# Patient Record
Sex: MALE | Race: WHITE | NOT HISPANIC OR LATINO | Employment: FULL TIME | URBAN - METROPOLITAN AREA
[De-identification: names, ages, dates, MRNs, and addresses within clinical notes are randomized per-mention and may not be internally consistent; named-entity substitution may affect disease eponyms.]

---

## 2018-07-09 ENCOUNTER — HOSPITAL ENCOUNTER (OUTPATIENT)
Facility: HOSPITAL | Age: 53
Discharge: HOME | End: 2018-07-10
Attending: EMERGENCY MEDICINE | Admitting: EMERGENCY MEDICINE

## 2018-07-09 ENCOUNTER — APPOINTMENT (OUTPATIENT)
Dept: GENERAL RADIOLOGY | Facility: HOSPITAL | Age: 53
End: 2018-07-09

## 2018-07-09 DIAGNOSIS — I25.119 CORONARY ARTERY DISEASE INVOLVING NATIVE CORONARY ARTERY OF NATIVE HEART WITH ANGINA PECTORIS (HCC): ICD-10-CM

## 2018-07-09 DIAGNOSIS — R07.9 CHEST PAIN, UNSPECIFIED TYPE: ICD-10-CM

## 2018-07-09 DIAGNOSIS — E78.5 HYPERLIPIDEMIA LDL GOAL <70: ICD-10-CM

## 2018-07-09 DIAGNOSIS — I10 ESSENTIAL HYPERTENSION: ICD-10-CM

## 2018-07-09 DIAGNOSIS — I25.110 CORONARY ARTERY DISEASE INVOLVING NATIVE CORONARY ARTERY OF NATIVE HEART WITH UNSTABLE ANGINA PECTORIS (HCC): Primary | ICD-10-CM

## 2018-07-09 PROBLEM — I24.9 ACS (ACUTE CORONARY SYNDROME): Status: RESOLVED | Noted: 2018-07-09 | Resolved: 2018-07-09

## 2018-07-09 PROBLEM — E11.9 TYPE 2 DIABETES MELLITUS WITHOUT COMPLICATION, WITHOUT LONG-TERM CURRENT USE OF INSULIN (HCC): Status: ACTIVE | Noted: 2018-07-09

## 2018-07-09 PROBLEM — I24.9 ACUTE CORONARY SYNDROME: Status: ACTIVE | Noted: 2018-07-09

## 2018-07-09 PROBLEM — I24.9 ACS (ACUTE CORONARY SYNDROME): Status: ACTIVE | Noted: 2018-07-09

## 2018-07-09 LAB
ACT BLD: 384 SECONDS (ref 82–152)
ACT BLD: 466 SECONDS (ref 82–152)
ALBUMIN SERPL-MCNC: 4.62 G/DL (ref 3.2–4.8)
ALBUMIN/GLOB SERPL: 1.4 G/DL (ref 1.5–2.5)
ALP SERPL-CCNC: 67 U/L (ref 25–100)
ALT SERPL W P-5'-P-CCNC: 46 U/L (ref 7–40)
ANION GAP SERPL CALCULATED.3IONS-SCNC: 6 MMOL/L (ref 3–11)
AST SERPL-CCNC: 34 U/L (ref 0–33)
BASOPHILS # BLD AUTO: 0.03 10*3/MM3 (ref 0–0.2)
BASOPHILS NFR BLD AUTO: 0.3 % (ref 0–1)
BILIRUB SERPL-MCNC: 0.6 MG/DL (ref 0.3–1.2)
BNP SERPL-MCNC: 49 PG/ML (ref 0–100)
BUN BLD-MCNC: 18 MG/DL (ref 9–23)
BUN/CREAT SERPL: 15.8 (ref 7–25)
CALCIUM SPEC-SCNC: 9.8 MG/DL (ref 8.7–10.4)
CHLORIDE SERPL-SCNC: 102 MMOL/L (ref 99–109)
CO2 SERPL-SCNC: 30 MMOL/L (ref 20–31)
CREAT BLD-MCNC: 1.14 MG/DL (ref 0.6–1.3)
DEPRECATED RDW RBC AUTO: 37.5 FL (ref 37–54)
EOSINOPHIL # BLD AUTO: 0.26 10*3/MM3 (ref 0–0.3)
EOSINOPHIL NFR BLD AUTO: 2.8 % (ref 0–3)
ERYTHROCYTE [DISTWIDTH] IN BLOOD BY AUTOMATED COUNT: 12.8 % (ref 11.3–14.5)
GFR SERPL CREATININE-BSD FRML MDRD: 68 ML/MIN/1.73
GFR SERPL CREATININE-BSD FRML MDRD: 82 ML/MIN/1.73
GLOBULIN UR ELPH-MCNC: 3.4 GM/DL
GLUCOSE BLD-MCNC: 171 MG/DL (ref 70–100)
GLUCOSE BLDC GLUCOMTR-MCNC: 159 MG/DL (ref 70–130)
GLUCOSE BLDC GLUCOMTR-MCNC: 87 MG/DL (ref 70–130)
HCT VFR BLD AUTO: 40.4 % (ref 38.9–50.9)
HGB BLD-MCNC: 13.6 G/DL (ref 13.1–17.5)
HOLD SPECIMEN: NORMAL
HOLD SPECIMEN: NORMAL
IMM GRANULOCYTES # BLD: 0.1 10*3/MM3 (ref 0–0.03)
IMM GRANULOCYTES NFR BLD: 1.1 % (ref 0–0.6)
LIPASE SERPL-CCNC: 42 U/L (ref 6–51)
LYMPHOCYTES # BLD AUTO: 1.63 10*3/MM3 (ref 0.6–4.8)
LYMPHOCYTES NFR BLD AUTO: 17.5 % (ref 24–44)
MCH RBC QN AUTO: 27.3 PG (ref 27–31)
MCHC RBC AUTO-ENTMCNC: 33.7 G/DL (ref 32–36)
MCV RBC AUTO: 81.1 FL (ref 80–99)
MONOCYTES # BLD AUTO: 0.81 10*3/MM3 (ref 0–1)
MONOCYTES NFR BLD AUTO: 8.7 % (ref 0–12)
NEUTROPHILS # BLD AUTO: 6.6 10*3/MM3 (ref 1.5–8.3)
NEUTROPHILS NFR BLD AUTO: 70.7 % (ref 41–71)
PLATELET # BLD AUTO: 271 10*3/MM3 (ref 150–450)
PMV BLD AUTO: 11 FL (ref 6–12)
POTASSIUM BLD-SCNC: 3.6 MMOL/L (ref 3.5–5.5)
PROT SERPL-MCNC: 8 G/DL (ref 5.7–8.2)
RBC # BLD AUTO: 4.98 10*6/MM3 (ref 4.2–5.76)
SODIUM BLD-SCNC: 138 MMOL/L (ref 132–146)
TROPONIN I SERPL-MCNC: 9.63 NG/ML (ref 0–0.07)
WBC NRBC COR # BLD: 9.33 10*3/MM3 (ref 3.5–10.8)
WHOLE BLOOD HOLD SPECIMEN: NORMAL
WHOLE BLOOD HOLD SPECIMEN: NORMAL

## 2018-07-09 PROCEDURE — 93454 CORONARY ARTERY ANGIO S&I: CPT | Performed by: INTERNAL MEDICINE

## 2018-07-09 PROCEDURE — 92929 PR PRQ TRLUML CORONARY STENT W/ANGIO ADDL ART/BRNCH: CPT | Performed by: INTERNAL MEDICINE

## 2018-07-09 PROCEDURE — C1725 CATH, TRANSLUMIN NON-LASER: HCPCS | Performed by: INTERNAL MEDICINE

## 2018-07-09 PROCEDURE — 80053 COMPREHEN METABOLIC PANEL: CPT | Performed by: EMERGENCY MEDICINE

## 2018-07-09 PROCEDURE — 93005 ELECTROCARDIOGRAM TRACING: CPT

## 2018-07-09 PROCEDURE — 96375 TX/PRO/DX INJ NEW DRUG ADDON: CPT

## 2018-07-09 PROCEDURE — 25010000002 ONDANSETRON PER 1 MG: Performed by: NURSE PRACTITIONER

## 2018-07-09 PROCEDURE — 96366 THER/PROPH/DIAG IV INF ADDON: CPT

## 2018-07-09 PROCEDURE — C1874 STENT, COATED/COV W/DEL SYS: HCPCS | Performed by: INTERNAL MEDICINE

## 2018-07-09 PROCEDURE — 71045 X-RAY EXAM CHEST 1 VIEW: CPT

## 2018-07-09 PROCEDURE — 93005 ELECTROCARDIOGRAM TRACING: CPT | Performed by: EMERGENCY MEDICINE

## 2018-07-09 PROCEDURE — 93005 ELECTROCARDIOGRAM TRACING: CPT | Performed by: INTERNAL MEDICINE

## 2018-07-09 PROCEDURE — 25010000002 HEPARIN (PORCINE) PER 1000 UNITS: Performed by: INTERNAL MEDICINE

## 2018-07-09 PROCEDURE — 0 IOPAMIDOL PER 1 ML: Performed by: INTERNAL MEDICINE

## 2018-07-09 PROCEDURE — C1769 GUIDE WIRE: HCPCS | Performed by: INTERNAL MEDICINE

## 2018-07-09 PROCEDURE — 96365 THER/PROPH/DIAG IV INF INIT: CPT

## 2018-07-09 PROCEDURE — 92941 PRQ TRLML REVSC TOT OCCL AMI: CPT | Performed by: INTERNAL MEDICINE

## 2018-07-09 PROCEDURE — 83690 ASSAY OF LIPASE: CPT | Performed by: EMERGENCY MEDICINE

## 2018-07-09 PROCEDURE — 85347 COAGULATION TIME ACTIVATED: CPT

## 2018-07-09 PROCEDURE — C1887 CATHETER, GUIDING: HCPCS | Performed by: INTERNAL MEDICINE

## 2018-07-09 PROCEDURE — 83880 ASSAY OF NATRIURETIC PEPTIDE: CPT | Performed by: EMERGENCY MEDICINE

## 2018-07-09 PROCEDURE — 85025 COMPLETE CBC W/AUTO DIFF WBC: CPT | Performed by: EMERGENCY MEDICINE

## 2018-07-09 PROCEDURE — C9601 PERC DRUG-EL COR STENT BRAN: HCPCS | Performed by: INTERNAL MEDICINE

## 2018-07-09 PROCEDURE — 84484 ASSAY OF TROPONIN QUANT: CPT

## 2018-07-09 PROCEDURE — C9606 PERC D-E COR REVASC W AMI S: HCPCS | Performed by: INTERNAL MEDICINE

## 2018-07-09 PROCEDURE — 82962 GLUCOSE BLOOD TEST: CPT

## 2018-07-09 PROCEDURE — C1894 INTRO/SHEATH, NON-LASER: HCPCS | Performed by: INTERNAL MEDICINE

## 2018-07-09 PROCEDURE — 99285 EMERGENCY DEPT VISIT HI MDM: CPT

## 2018-07-09 PROCEDURE — 63710000001 INSULIN LISPRO (HUMAN) PER 5 UNITS: Performed by: INTERNAL MEDICINE

## 2018-07-09 PROCEDURE — 99219 PR INITIAL OBSERVATION CARE/DAY 50 MINUTES: CPT | Performed by: INTERNAL MEDICINE

## 2018-07-09 PROCEDURE — 25010000002 FENTANYL CITRATE (PF) 100 MCG/2ML SOLUTION: Performed by: INTERNAL MEDICINE

## 2018-07-09 PROCEDURE — 25010000002 MIDAZOLAM PER 1 MG: Performed by: INTERNAL MEDICINE

## 2018-07-09 PROCEDURE — 93010 ELECTROCARDIOGRAM REPORT: CPT | Performed by: INTERNAL MEDICINE

## 2018-07-09 DEVICE — XIENCE SIERRA™ EVEROLIMUS ELUTING CORONARY STENT SYSTEM 3.25 MM X 15 MM / RAPID-EXCHANGE
Type: IMPLANTABLE DEVICE | Status: FUNCTIONAL
Brand: XIENCE SIERRA™

## 2018-07-09 DEVICE — XIENCE SIERRA™ EVEROLIMUS ELUTING CORONARY STENT SYSTEM 2.50 MM X 23 MM / RAPID-EXCHANGE
Type: IMPLANTABLE DEVICE | Status: FUNCTIONAL
Brand: XIENCE SIERRA™

## 2018-07-09 DEVICE — XIENCE SIERRA™ EVEROLIMUS ELUTING CORONARY STENT SYSTEM 2.25 MM X 38 MM / RAPID-EXCHANGE
Type: IMPLANTABLE DEVICE | Status: FUNCTIONAL
Brand: XIENCE SIERRA™

## 2018-07-09 RX ORDER — POTASSIUM CHLORIDE 1.5 G/1.77G
40 POWDER, FOR SOLUTION ORAL AS NEEDED
Status: DISCONTINUED | OUTPATIENT
Start: 2018-07-09 | End: 2018-07-10 | Stop reason: HOSPADM

## 2018-07-09 RX ORDER — ASPIRIN 81 MG/1
81 TABLET ORAL DAILY
Status: DISCONTINUED | OUTPATIENT
Start: 2018-07-10 | End: 2018-07-10 | Stop reason: HOSPADM

## 2018-07-09 RX ORDER — ONDANSETRON 2 MG/ML
4 INJECTION INTRAMUSCULAR; INTRAVENOUS ONCE
Status: COMPLETED | OUTPATIENT
Start: 2018-07-09 | End: 2018-07-09

## 2018-07-09 RX ORDER — HYDROCHLOROTHIAZIDE 12.5 MG/1
12.5 TABLET ORAL DAILY
Status: DISCONTINUED | OUTPATIENT
Start: 2018-07-09 | End: 2018-07-10

## 2018-07-09 RX ORDER — ATORVASTATIN CALCIUM 40 MG/1
40 TABLET, FILM COATED ORAL DAILY
COMMUNITY
End: 2018-08-14

## 2018-07-09 RX ORDER — NITROGLYCERIN 20 MG/100ML
5-200 INJECTION INTRAVENOUS
Status: DISCONTINUED | OUTPATIENT
Start: 2018-07-09 | End: 2018-07-09

## 2018-07-09 RX ORDER — GLYBURIDE 2.5 MG/1
2.5 TABLET ORAL
COMMUNITY
End: 2018-08-14

## 2018-07-09 RX ORDER — FENOFIBRATE 160 MG/1
160 TABLET ORAL DAILY
COMMUNITY
End: 2018-07-10 | Stop reason: HOSPADM

## 2018-07-09 RX ORDER — HYDROCHLOROTHIAZIDE 12.5 MG/1
12.5 TABLET ORAL DAILY
COMMUNITY
End: 2018-07-10 | Stop reason: HOSPADM

## 2018-07-09 RX ORDER — LORAZEPAM 1 MG/1
1 TABLET ORAL EVERY 6 HOURS PRN
Status: DISCONTINUED | OUTPATIENT
Start: 2018-07-09 | End: 2018-07-10 | Stop reason: HOSPADM

## 2018-07-09 RX ORDER — SODIUM CHLORIDE 0.9 % (FLUSH) 0.9 %
10 SYRINGE (ML) INJECTION AS NEEDED
Status: DISCONTINUED | OUTPATIENT
Start: 2018-07-09 | End: 2018-07-10 | Stop reason: HOSPADM

## 2018-07-09 RX ORDER — ASPIRIN 81 MG/1
81 TABLET ORAL DAILY
COMMUNITY
End: 2018-08-14 | Stop reason: SDUPTHER

## 2018-07-09 RX ORDER — NICOTINE POLACRILEX 4 MG
15 LOZENGE BUCCAL
Status: DISCONTINUED | OUTPATIENT
Start: 2018-07-09 | End: 2018-07-10 | Stop reason: HOSPADM

## 2018-07-09 RX ORDER — HEPARIN SODIUM 1000 [USP'U]/ML
INJECTION, SOLUTION INTRAVENOUS; SUBCUTANEOUS AS NEEDED
Status: DISCONTINUED | OUTPATIENT
Start: 2018-07-09 | End: 2018-07-09 | Stop reason: HOSPADM

## 2018-07-09 RX ORDER — SODIUM CHLORIDE 9 MG/ML
3 INJECTION, SOLUTION INTRAVENOUS CONTINUOUS
Status: ACTIVE | OUTPATIENT
Start: 2018-07-09 | End: 2018-07-09

## 2018-07-09 RX ORDER — MIDAZOLAM HYDROCHLORIDE 1 MG/ML
INJECTION INTRAMUSCULAR; INTRAVENOUS AS NEEDED
Status: DISCONTINUED | OUTPATIENT
Start: 2018-07-09 | End: 2018-07-09 | Stop reason: HOSPADM

## 2018-07-09 RX ORDER — FENTANYL CITRATE 50 UG/ML
INJECTION, SOLUTION INTRAMUSCULAR; INTRAVENOUS AS NEEDED
Status: DISCONTINUED | OUTPATIENT
Start: 2018-07-09 | End: 2018-07-09 | Stop reason: HOSPADM

## 2018-07-09 RX ORDER — METFORMIN HYDROCHLORIDE 750 MG/1
750 TABLET, EXTENDED RELEASE ORAL 2 TIMES DAILY
COMMUNITY

## 2018-07-09 RX ORDER — MAGNESIUM SULFATE HEPTAHYDRATE 40 MG/ML
2 INJECTION, SOLUTION INTRAVENOUS AS NEEDED
Status: DISCONTINUED | OUTPATIENT
Start: 2018-07-09 | End: 2018-07-10 | Stop reason: HOSPADM

## 2018-07-09 RX ORDER — ASPIRIN 81 MG/1
324 TABLET, CHEWABLE ORAL ONCE
Status: COMPLETED | OUTPATIENT
Start: 2018-07-09 | End: 2018-07-09

## 2018-07-09 RX ORDER — DEXTROSE MONOHYDRATE 25 G/50ML
25 INJECTION, SOLUTION INTRAVENOUS
Status: DISCONTINUED | OUTPATIENT
Start: 2018-07-09 | End: 2018-07-10 | Stop reason: HOSPADM

## 2018-07-09 RX ORDER — ATORVASTATIN CALCIUM 40 MG/1
40 TABLET, FILM COATED ORAL DAILY
Status: DISCONTINUED | OUTPATIENT
Start: 2018-07-09 | End: 2018-07-10 | Stop reason: HOSPADM

## 2018-07-09 RX ORDER — POTASSIUM CHLORIDE 750 MG/1
40 CAPSULE, EXTENDED RELEASE ORAL AS NEEDED
Status: DISCONTINUED | OUTPATIENT
Start: 2018-07-09 | End: 2018-07-10 | Stop reason: HOSPADM

## 2018-07-09 RX ORDER — MAGNESIUM SULFATE HEPTAHYDRATE 40 MG/ML
4 INJECTION, SOLUTION INTRAVENOUS AS NEEDED
Status: DISCONTINUED | OUTPATIENT
Start: 2018-07-09 | End: 2018-07-10 | Stop reason: HOSPADM

## 2018-07-09 RX ORDER — SODIUM CHLORIDE 0.9 % (FLUSH) 0.9 %
1-10 SYRINGE (ML) INJECTION AS NEEDED
Status: DISCONTINUED | OUTPATIENT
Start: 2018-07-09 | End: 2018-07-10 | Stop reason: HOSPADM

## 2018-07-09 RX ADMIN — TICAGRELOR 90 MG: 90 TABLET ORAL at 20:39

## 2018-07-09 RX ADMIN — ONDANSETRON 4 MG: 2 INJECTION INTRAMUSCULAR; INTRAVENOUS at 14:00

## 2018-07-09 RX ADMIN — NITROGLYCERIN 10 MCG/MIN: 20 INJECTION INTRAVENOUS at 12:58

## 2018-07-09 RX ADMIN — INSULIN LISPRO 2 UNITS: 100 INJECTION, SOLUTION INTRAVENOUS; SUBCUTANEOUS at 20:39

## 2018-07-09 RX ADMIN — ASPIRIN 81 MG 324 MG: 81 TABLET ORAL at 12:24

## 2018-07-09 RX ADMIN — METOPROLOL TARTRATE 25 MG: 25 TABLET ORAL at 20:39

## 2018-07-09 RX ADMIN — ATORVASTATIN CALCIUM 40 MG: 40 TABLET, FILM COATED ORAL at 18:05

## 2018-07-09 NOTE — H&P
[facility] at [facility]  Cardiovascular Consultation Note    Reason for admission: Acute coronary syndrome          53-year-old gentleman with a strong family history of precocious coronary artery disease who 4 days ago on 7/5/18 developed substernal chest pain and was diagnosed with inferior STEMI. He was taken to [facility] and underwent reportedly complex PCI of his circumflex and right coronary arteries. In the catheterization note, there was mention of residual high-grade LAD disease which was not treated during the index procedure. The patient was discharged home as expected following his procedure. This morning, he developed recurrent chest pain symptoms identical to what he felt at the time of his STEMI. He presented to [facility] emergency room. No ST elevation was noted on EKG. Troponin measured 9. The patient presently chest pain-free on intravenous nitroglycerin.    Cardiac risk factors:  Family history of precocious coronary artery disease, known coronary artery disease, hypertension, hyperlipidemia, diabetes     Past medical and surgical history, social and family history reviewed in EMR.    REVIEW OF SYSTEMS:   H&P ROS reviewed and pertinent CV ROS as noted in HPI.         Vital Sign Min/Max for last 24 hours  Temp  Min: 98.3 °F (36.8 °C)  Max: 99 °F (37.2 °C)   BP  Min: 114/50  Max: 175/102   Pulse  Min: 81  Max: 94   Resp  Min: 16  Max: 18   SpO2  Min: 95 %  Max: 99 %   No Data Recorded    No intake or output data in the 24 hours ending 07/09/18 1500        Physical Exam   Constitutional: He is oriented to person, place, and time. He appears well-developed and well-nourished.   HENT:   Head: Normocephalic and atraumatic.   Eyes: Pupils are equal, round, and reactive to light. No scleral icterus.   Neck: No JVD present. Carotid bruit is not present. No thyromegaly present.   Cardiovascular: Normal rate and regular rhythm.  Exam reveals no gallop.     No murmur heard.  Pulmonary/Chest: Effort normal and breath sounds normal.   Abdominal: Soft. He exhibits no distension. There is no hepatosplenomegaly.   Musculoskeletal: He exhibits no edema.   Neurological: He is alert and oriented to person, place, and time.   Skin: Skin is warm and dry.   Psychiatric: He has a normal mood and affect. His behavior is normal.       EKG: NSR. T-wave inversion in the inferior lateral leads.    Lab Review:   Labs reviewed in the electronic medical record.  Pertinent findings include:  Lab Results   Component Value Date    GLUCOSE 171 (H) 07/09/2018    BUN 18 07/09/2018    CREATININE 1.14 07/09/2018    EGFRIFNONA 68 07/09/2018    EGFRIFAFRI 82 07/09/2018    BCR 15.8 07/09/2018    K 3.6 07/09/2018    CO2 30.0 07/09/2018    CALCIUM 9.8 07/09/2018    ALBUMIN 4.62 07/09/2018    LABIL2 1.4 (L) 07/09/2018    AST 34 (H) 07/09/2018    ALT 46 (H) 07/09/2018     Lab Results   Component Value Date    WBC 9.33 07/09/2018    HGB 13.6 07/09/2018    HCT 40.4 07/09/2018    MCV 81.1 07/09/2018     07/09/2018          Hospital Problem List     * (Principal)Acute coronary syndrome (CMS/HCC)    Overview Signed 7/9/2018  2:56 PM by Lio Abraham IV, MD     · [facility] ER presentation with recurrent chest pain and persistent biomarker elevation 4 days after inferior STEMI with PCI, 7/9/18         Coronary artery disease involving native coronary artery of native heart with angina pectoris (CMS/HCC)    Overview Addendum 7/9/2018  2:54 PM by Lio Abraham IV, MD     · Cardiac catheterization for inferior STEMI @ [facility] (07/05/2018): PCI to RCA, PCI to Circumflex.  Thrombectomy to circumflex.  POBA to RPL  · [facility] ER presentation with recurrent chest pain and persistent biomarker elevation, 7/09/2018         Essential hypertension    Hyperlipidemia LDL goal <70    Overview Signed 7/9/2018  2:31 PM by JONAH Rordiguez     · High intensity statin  therapy indicated given presence of CAD         Type 2 diabetes mellitus without complication, without long-term current use of insulin (CMS/[facility])         53-year-old gentleman who underwent primary PCI for STEMI on 7/5/18 who presents with recurrent chest pain symptoms. Troponin elevation is likely due to his primary event. However, he had a complex procedure per the 's report and also had residual LAD disease. I recommend we proceed with repeat cardiac catheterization. The patient and wife are in agreement.          · Cardiac cath via the left radial approach  · Further recommendations to follow      Lio Abraham IV, MD  7/9/2018

## 2018-07-09 NOTE — ED PROVIDER NOTES
Subjective   CP.    Just DC from [facility] yesterday for MI and x 3 stents.    Pain has gotten worse.    Does not want to go back to [place].    Told he had blockage they could not stent.        Chest Pain   Pain location:  Substernal area  Pain quality: aching    Pain radiates to:  L arm  Pain severity:  Moderate  Timing:  Constant  Progression:  Waxing and waning  Chronicity:  New  Context: at rest    Relieved by:  Rest  Worsened by:  Exertion  Ineffective treatments:  None tried  Associated symptoms: dizziness and nausea    Associated symptoms: no abdominal pain, no cough, no diaphoresis, no fever, no shortness of breath and no vomiting        Review of Systems   Constitutional: Negative for diaphoresis and fever.   HENT: Negative for congestion and sore throat.    Respiratory: Negative for cough, choking, chest tightness, shortness of breath and wheezing.    Cardiovascular: Positive for chest pain. Negative for leg swelling.   Gastrointestinal: Positive for nausea. Negative for abdominal distention, abdominal pain, diarrhea and vomiting.   Neurological: Positive for dizziness.   All other systems reviewed and are negative.      Past Medical History:   Diagnosis Date   • Diabetes mellitus (CMS/HCC)    • Hyperlipidemia    • Hypertension    • Myocardial infarction        Allergies   Allergen Reactions   • Codeine Anaphylaxis   • Lisinopril Cough       Past Surgical History:   Procedure Laterality Date   • ANKLE SURGERY Left    • CORONARY STENT PLACEMENT  07/05/2018   • SHOULDER ROTATOR CUFF REPAIR Right        History reviewed. No pertinent family history.    Social History     Social History   • Marital status:      Social History Main Topics   • Smoking status: Never Smoker   • Alcohol use No   • Drug use: No   • Sexual activity: Defer     Other Topics Concern   • Not on file           Objective   Physical Exam   Constitutional: He is oriented to person, place, and time. He appears  well-developed and well-nourished.   HENT:   Head: Normocephalic and atraumatic.   Right Ear: External ear normal.   Left Ear: External ear normal.   Nose: Nose normal.   Mouth/Throat: Oropharynx is clear and moist.   Eyes: Conjunctivae and EOM are normal. Pupils are equal, round, and reactive to light.   Neck: Normal range of motion. Neck supple.   Cardiovascular: Normal rate, regular rhythm, normal heart sounds and intact distal pulses.    Pulmonary/Chest: Effort normal and breath sounds normal.   Abdominal: Soft. Bowel sounds are normal.   Musculoskeletal: Normal range of motion.   Neurological: He is alert and oriented to person, place, and time.   Skin: Skin is warm and dry.   Psychiatric: He has a normal mood and affect. His behavior is normal. Judgment normal.       Critical Care  Performed by: SEBLE LONGORIA  Authorized by: MARIA VICTORIA BHANDARI     Critical care provider statement:     Critical care time (minutes):  35    Critical care was necessary to treat or prevent imminent or life-threatening deterioration of the following conditions:  Cardiac failure    Critical care was time spent personally by me on the following activities:  Re-evaluation of patient's condition, ordering and review of laboratory studies, examination of patient, evaluation of patient's response to treatment and discussions with consultants               ED Course  ED Course as of Jul 09 1525   Mon Jul 09, 2018   1338 Previous trp 30 at [place] recently.    I spoke with Tanvi with [employer] and they will see the pt.  [KARIME]      ED Course User Index  [JM] JONAH Milner                  [facility]      Final diagnoses:   Coronary artery disease involving native coronary artery of native heart with unstable angina pectoris (CMS/[facility])   Chest pain, unspecified type            JONAH Milner  07/09/18 1526

## 2018-07-10 ENCOUNTER — APPOINTMENT (OUTPATIENT)
Dept: CARDIOLOGY | Facility: HOSPITAL | Age: 53
End: 2018-07-10
Attending: INTERNAL MEDICINE

## 2018-07-10 VITALS
SYSTOLIC BLOOD PRESSURE: 130 MMHG | BODY MASS INDEX: 32.82 KG/M2 | TEMPERATURE: 98.2 F | OXYGEN SATURATION: 95 % | RESPIRATION RATE: 16 BRPM | HEIGHT: 74 IN | WEIGHT: 255.73 LBS | DIASTOLIC BLOOD PRESSURE: 81 MMHG | HEART RATE: 91 BPM

## 2018-07-10 PROBLEM — I25.5 ISCHEMIC CARDIOMYOPATHY: Status: ACTIVE | Noted: 2018-07-10

## 2018-07-10 LAB
ALBUMIN SERPL-MCNC: 4.05 G/DL (ref 3.2–4.8)
ALBUMIN/GLOB SERPL: 1.5 G/DL (ref 1.5–2.5)
ALP SERPL-CCNC: 59 U/L (ref 25–100)
ALT SERPL W P-5'-P-CCNC: 37 U/L (ref 7–40)
ANION GAP SERPL CALCULATED.3IONS-SCNC: 6 MMOL/L (ref 3–11)
ANION GAP SERPL CALCULATED.3IONS-SCNC: 6 MMOL/L (ref 3–11)
ARTICHOKE IGE QN: 89 MG/DL (ref 0–130)
ARTICHOKE IGE QN: 95 MG/DL (ref 0–130)
AST SERPL-CCNC: 30 U/L (ref 0–33)
BH CV ECHO MEAS - AO MAX PG (FULL): 1.3 MMHG
BH CV ECHO MEAS - AO MAX PG: 5 MMHG
BH CV ECHO MEAS - AO MEAN PG (FULL): 1.5 MMHG
BH CV ECHO MEAS - AO MEAN PG: 3.2 MMHG
BH CV ECHO MEAS - AO ROOT AREA (BSA CORRECTED): 1.5
BH CV ECHO MEAS - AO ROOT AREA: 10.3 CM^2
BH CV ECHO MEAS - AO ROOT DIAM: 3.6 CM
BH CV ECHO MEAS - AO V2 MAX: 107.7 CM/SEC
BH CV ECHO MEAS - AO V2 MEAN: 86.9 CM/SEC
BH CV ECHO MEAS - AO V2 VTI: 22.6 CM
BH CV ECHO MEAS - AVA(I,A): 3.6 CM^2
BH CV ECHO MEAS - AVA(I,D): 3.6 CM^2
BH CV ECHO MEAS - AVA(V,A): 4.4 CM^2
BH CV ECHO MEAS - AVA(V,D): 4.4 CM^2
BH CV ECHO MEAS - BSA(HAYCOCK): 2.5 M^2
BH CV ECHO MEAS - BSA: 2.4 M^2
BH CV ECHO MEAS - BZI_BMI: 32.1 KILOGRAMS/M^2
BH CV ECHO MEAS - BZI_METRIC_HEIGHT: 190 CM
BH CV ECHO MEAS - BZI_METRIC_WEIGHT: 116 KG
BH CV ECHO MEAS - CONTRAST EF (2CH): 37.9 ML/M^2
BH CV ECHO MEAS - CONTRAST EF 4CH: 46.2 ML/M^2
BH CV ECHO MEAS - EDV(CUBED): 130.3 ML
BH CV ECHO MEAS - EDV(MOD-SP2): 153 ML
BH CV ECHO MEAS - EDV(MOD-SP4): 199 ML
BH CV ECHO MEAS - EDV(TEICH): 122.1 ML
BH CV ECHO MEAS - EF(CUBED): 69.2 %
BH CV ECHO MEAS - EF(MOD-SP2): 37.9 %
BH CV ECHO MEAS - EF(MOD-SP4): 46.2 %
BH CV ECHO MEAS - EF(TEICH): 60.5 %
BH CV ECHO MEAS - ESV(CUBED): 40.2 ML
BH CV ECHO MEAS - ESV(MOD-SP2): 95 ML
BH CV ECHO MEAS - ESV(MOD-SP4): 107 ML
BH CV ECHO MEAS - ESV(TEICH): 48.3 ML
BH CV ECHO MEAS - FS: 32.4 %
BH CV ECHO MEAS - IVS/LVPW: 0.85
BH CV ECHO MEAS - IVSD: 1.1 CM
BH CV ECHO MEAS - LA DIMENSION: 3.7 CM
BH CV ECHO MEAS - LA/AO: 1
BH CV ECHO MEAS - LAT PEAK E' VEL: 4.5 CM/SEC
BH CV ECHO MEAS - LV DIASTOLIC VOL/BSA (35-75): 81.8 ML/M^2
BH CV ECHO MEAS - LV MASS(C)D: 238.1 GRAMS
BH CV ECHO MEAS - LV MASS(C)DI: 97.9 GRAMS/M^2
BH CV ECHO MEAS - LV MAX PG: 3.7 MMHG
BH CV ECHO MEAS - LV MEAN PG: 1.7 MMHG
BH CV ECHO MEAS - LV SYSTOLIC VOL/BSA (12-30): 44 ML/M^2
BH CV ECHO MEAS - LV V1 MAX: 95.6 CM/SEC
BH CV ECHO MEAS - LV V1 MEAN: 60.6 CM/SEC
BH CV ECHO MEAS - LV V1 VTI: 16.4 CM
BH CV ECHO MEAS - LVIDD: 5.1 CM
BH CV ECHO MEAS - LVIDS: 3.4 CM
BH CV ECHO MEAS - LVLD AP2: 9.2 CM
BH CV ECHO MEAS - LVLD AP4: 9.5 CM
BH CV ECHO MEAS - LVLS AP2: 8.7 CM
BH CV ECHO MEAS - LVLS AP4: 8.7 CM
BH CV ECHO MEAS - LVOT AREA (M): 4.9 CM^2
BH CV ECHO MEAS - LVOT AREA: 5 CM^2
BH CV ECHO MEAS - LVOT DIAM: 2.5 CM
BH CV ECHO MEAS - LVPWD: 1.3 CM
BH CV ECHO MEAS - MED PEAK E' VEL: 6.03 CM/SEC
BH CV ECHO MEAS - MV A MAX VEL: 92.8 CM/SEC
BH CV ECHO MEAS - MV DEC SLOPE: 254.1 CM/SEC^2
BH CV ECHO MEAS - MV E MAX VEL: 61.2 CM/SEC
BH CV ECHO MEAS - MV E/A: 0.66
BH CV ECHO MEAS - MV P1/2T MAX VEL: 60.8 CM/SEC
BH CV ECHO MEAS - MV P1/2T: 70.1 MSEC
BH CV ECHO MEAS - MVA P1/2T LCG: 3.6 CM^2
BH CV ECHO MEAS - MVA(P1/2T): 3.1 CM^2
BH CV ECHO MEAS - PA ACC SLOPE: 388.2 CM/SEC^2
BH CV ECHO MEAS - PA ACC TIME: 0.14 SEC
BH CV ECHO MEAS - PA MAX PG (FULL): 3.7 MMHG
BH CV ECHO MEAS - PA MAX PG: 4.7 MMHG
BH CV ECHO MEAS - PA PR(ACCEL): 17.2 MMHG
BH CV ECHO MEAS - PA V2 MAX: 108.6 CM/SEC
BH CV ECHO MEAS - RV MAX PG: 1.1 MMHG
BH CV ECHO MEAS - RV V1 MAX: 51.3 CM/SEC
BH CV ECHO MEAS - SI(AO): 96.1 ML/M^2
BH CV ECHO MEAS - SI(CUBED): 37.1 ML/M^2
BH CV ECHO MEAS - SI(LVOT): 33.5 ML/M^2
BH CV ECHO MEAS - SI(MOD-SP2): 23.9 ML/M^2
BH CV ECHO MEAS - SI(MOD-SP4): 37.8 ML/M^2
BH CV ECHO MEAS - SI(TEICH): 30.4 ML/M^2
BH CV ECHO MEAS - SV(AO): 233.6 ML
BH CV ECHO MEAS - SV(CUBED): 90.1 ML
BH CV ECHO MEAS - SV(LVOT): 81.5 ML
BH CV ECHO MEAS - SV(MOD-SP2): 58 ML
BH CV ECHO MEAS - SV(MOD-SP4): 92 ML
BH CV ECHO MEAS - SV(TEICH): 73.8 ML
BH CV ECHO MEASUREMENTS AVERAGE E/E' RATIO: 11.62
BH CV XLRA - RV BASE: 4.1 CM
BH CV XLRA - RV LENGTH: 8.4 CM
BH CV XLRA - RV MID: 3.1 CM
BILIRUB SERPL-MCNC: 0.5 MG/DL (ref 0.3–1.2)
BUN BLD-MCNC: 15 MG/DL (ref 9–23)
BUN BLD-MCNC: 15 MG/DL (ref 9–23)
BUN/CREAT SERPL: 14.3 (ref 7–25)
BUN/CREAT SERPL: 14.3 (ref 7–25)
CALCIUM SPEC-SCNC: 8.8 MG/DL (ref 8.7–10.4)
CALCIUM SPEC-SCNC: 8.8 MG/DL (ref 8.7–10.4)
CHLORIDE SERPL-SCNC: 104 MMOL/L (ref 99–109)
CHLORIDE SERPL-SCNC: 104 MMOL/L (ref 99–109)
CHOLEST SERPL-MCNC: 129 MG/DL (ref 0–200)
CHOLEST SERPL-MCNC: 136 MG/DL (ref 0–200)
CO2 SERPL-SCNC: 28 MMOL/L (ref 20–31)
CO2 SERPL-SCNC: 28 MMOL/L (ref 20–31)
CREAT BLD-MCNC: 1.05 MG/DL (ref 0.6–1.3)
CREAT BLD-MCNC: 1.05 MG/DL (ref 0.6–1.3)
DEPRECATED RDW RBC AUTO: 39 FL (ref 37–54)
ERYTHROCYTE [DISTWIDTH] IN BLOOD BY AUTOMATED COUNT: 13 % (ref 11.3–14.5)
GFR SERPL CREATININE-BSD FRML MDRD: 74 ML/MIN/1.73
GFR SERPL CREATININE-BSD FRML MDRD: 74 ML/MIN/1.73
GLOBULIN UR ELPH-MCNC: 2.8 GM/DL
GLUCOSE BLD-MCNC: 156 MG/DL (ref 70–100)
GLUCOSE BLD-MCNC: 156 MG/DL (ref 70–100)
HBA1C MFR BLD: 9 % (ref 4.8–5.6)
HCT VFR BLD AUTO: 36.1 % (ref 38.9–50.9)
HDLC SERPL-MCNC: 28 MG/DL (ref 40–60)
HDLC SERPL-MCNC: 30 MG/DL (ref 40–60)
HGB BLD-MCNC: 12 G/DL (ref 13.1–17.5)
LEFT ATRIUM VOLUME INDEX: 27.2 ML/M2
LEFT ATRIUM VOLUME: 66 CM3
LV EF 2D ECHO EST: 42 %
MAGNESIUM SERPL-MCNC: 1.8 MG/DL (ref 1.3–2.7)
MCH RBC QN AUTO: 27.1 PG (ref 27–31)
MCHC RBC AUTO-ENTMCNC: 33.2 G/DL (ref 32–36)
MCV RBC AUTO: 81.5 FL (ref 80–99)
PLATELET # BLD AUTO: 228 10*3/MM3 (ref 150–450)
PMV BLD AUTO: 11.4 FL (ref 6–12)
POTASSIUM BLD-SCNC: 3.8 MMOL/L (ref 3.5–5.5)
POTASSIUM BLD-SCNC: 3.8 MMOL/L (ref 3.5–5.5)
PROT SERPL-MCNC: 6.8 G/DL (ref 5.7–8.2)
RBC # BLD AUTO: 4.43 10*6/MM3 (ref 4.2–5.76)
SODIUM BLD-SCNC: 138 MMOL/L (ref 132–146)
SODIUM BLD-SCNC: 138 MMOL/L (ref 132–146)
TRIGL SERPL-MCNC: 78 MG/DL (ref 0–150)
TRIGL SERPL-MCNC: 78 MG/DL (ref 0–150)
WBC NRBC COR # BLD: 8.28 10*3/MM3 (ref 3.5–10.8)

## 2018-07-10 PROCEDURE — 83735 ASSAY OF MAGNESIUM: CPT | Performed by: INTERNAL MEDICINE

## 2018-07-10 PROCEDURE — 25010000002 SULFUR HEXAFLUORIDE MICROSPH 60.7-25 MG RECONSTITUTED SUSPENSION: Performed by: INTERNAL MEDICINE

## 2018-07-10 PROCEDURE — 83036 HEMOGLOBIN GLYCOSYLATED A1C: CPT | Performed by: INTERNAL MEDICINE

## 2018-07-10 PROCEDURE — 93306 TTE W/DOPPLER COMPLETE: CPT | Performed by: INTERNAL MEDICINE

## 2018-07-10 PROCEDURE — 25010000002 ENOXAPARIN PER 10 MG: Performed by: INTERNAL MEDICINE

## 2018-07-10 PROCEDURE — 80061 LIPID PANEL: CPT | Performed by: INTERNAL MEDICINE

## 2018-07-10 PROCEDURE — 99217 PR OBSERVATION CARE DISCHARGE MANAGEMENT: CPT | Performed by: INTERNAL MEDICINE

## 2018-07-10 PROCEDURE — 85027 COMPLETE CBC AUTOMATED: CPT | Performed by: INTERNAL MEDICINE

## 2018-07-10 PROCEDURE — 93306 TTE W/DOPPLER COMPLETE: CPT

## 2018-07-10 PROCEDURE — 80053 COMPREHEN METABOLIC PANEL: CPT | Performed by: INTERNAL MEDICINE

## 2018-07-10 RX ORDER — NITROGLYCERIN 0.4 MG/1
TABLET SUBLINGUAL
Qty: 25 TABLET | Refills: 11 | Status: SHIPPED | OUTPATIENT
Start: 2018-07-10 | End: 2018-07-11

## 2018-07-10 RX ORDER — VALSARTAN 80 MG/1
80 TABLET ORAL DAILY
Qty: 30 TABLET | Refills: 11 | Status: SHIPPED | OUTPATIENT
Start: 2018-07-10 | End: 2018-07-11

## 2018-07-10 RX ADMIN — METOPROLOL TARTRATE 25 MG: 25 TABLET ORAL at 08:04

## 2018-07-10 RX ADMIN — ENOXAPARIN SODIUM 40 MG: 40 INJECTION SUBCUTANEOUS at 05:24

## 2018-07-10 RX ADMIN — ASPIRIN 81 MG: 81 TABLET, COATED ORAL at 08:04

## 2018-07-10 RX ADMIN — ATORVASTATIN CALCIUM 40 MG: 40 TABLET, FILM COATED ORAL at 08:04

## 2018-07-10 RX ADMIN — SULFUR HEXAFLUORIDE 2 ML: KIT at 10:15

## 2018-07-10 RX ADMIN — TICAGRELOR 90 MG: 90 TABLET ORAL at 08:04

## 2018-07-10 NOTE — PLAN OF CARE
Problem: Patient Care Overview  Goal: Plan of Care Review  Outcome: Ongoing (interventions implemented as appropriate)   07/10/18 0700   Coping/Psychosocial   Plan of Care Reviewed With patient   Plan of Care Review   Progress improving   OTHER   Outcome Summary PT AWAITNG DISCHARGE. NO PROBLEMS NOTED AT THIS TIME.WILL CONTINUE MONITORING.       Problem: Cardiac Catheterization (Diagnostic/Interventional) (Adult)  Goal: Signs and Symptoms of Listed Potential Problems Will be Absent, Minimized or Managed (Cardiac Catheterization)  Outcome: Ongoing (interventions implemented as appropriate)   07/10/18 0700   Goal/Outcome Evaluation   Problems Assessed (Cardiac Catheterization) all   Problems Present (Cardiac Cath) none

## 2018-07-10 NOTE — PLAN OF CARE
Problem: Patient Care Overview  Goal: Plan of Care Review  Outcome: Ongoing (interventions implemented as appropriate)    Goal: Individualization and Mutuality  Outcome: Ongoing (interventions implemented as appropriate)    Goal: Discharge Needs Assessment  Outcome: Ongoing (interventions implemented as appropriate)    Goal: Interprofessional Rounds/Family Conf  Outcome: Ongoing (interventions implemented as appropriate)      Problem: Cardiac Catheterization (Diagnostic/Interventional) (Adult)  Goal: Signs and Symptoms of Listed Potential Problems Will be Absent, Minimized or Managed (Cardiac Catheterization)  Outcome: Ongoing (interventions implemented as appropriate)    Goal: Anesthesia/Sedation Recovery  Outcome: Outcome(s) achieved Date Met: 07/09/18

## 2018-07-11 ENCOUNTER — DOCUMENTATION (OUTPATIENT)
Dept: CARDIAC REHAB | Facility: HOSPITAL | Age: 53
End: 2018-07-11

## 2018-07-11 RX ORDER — VALSARTAN 80 MG/1
80 TABLET ORAL DAILY
Qty: 30 TABLET | Refills: 10 | Status: SHIPPED | OUTPATIENT
Start: 2018-07-11 | End: 2018-08-14

## 2018-07-11 RX ORDER — NITROGLYCERIN 0.4 MG/1
TABLET SUBLINGUAL
Qty: 25 TABLET | Refills: 10 | Status: SHIPPED | OUTPATIENT
Start: 2018-07-11 | End: 2018-10-11 | Stop reason: SDUPTHER

## 2018-07-18 ENCOUNTER — TELEPHONE (OUTPATIENT)
Dept: CARDIOLOGY | Facility: CLINIC | Age: 53
End: 2018-07-18

## 2018-07-18 NOTE — TELEPHONE ENCOUNTER
I spoke with the patient. [facility] needs his written permission for us to appeal his Brilinta. He verbalized understanding of completing the form and mailing it back.

## 2018-07-24 ENCOUNTER — TELEPHONE (OUTPATIENT)
Dept: CARDIOLOGY | Facility: CLINIC | Age: 53
End: 2018-07-24

## 2018-07-24 ENCOUNTER — DOCUMENTATION (OUTPATIENT)
Dept: CARDIAC REHAB | Facility: HOSPITAL | Age: 53
End: 2018-07-24

## 2018-07-24 DIAGNOSIS — R06.02 SHORTNESS OF BREATH: Primary | ICD-10-CM

## 2018-07-24 RX ORDER — CLOPIDOGREL BISULFATE 75 MG/1
75 TABLET ORAL DAILY
Qty: 93 TABLET | Refills: 0 | Status: SHIPPED | OUTPATIENT
Start: 2018-07-24 | End: 2018-08-14 | Stop reason: SDUPTHER

## 2018-07-24 NOTE — PROGRESS NOTES
Pt. Referred for Phase II Cardiac Rehab. Staff discussed benefits of exercise, program protocol, and educational material provided. Teach back verified.  Permission granted from patient for staff to fax referral information to outlying program at this time.  Staff to fax referral info to [facility].

## 2018-07-24 NOTE — TELEPHONE ENCOUNTER
Patient called to report that he has been having dizziness when he bends over, shortness of breath at rest and exertion, fatigue, achy, flu like symptoms. This has occurred the last 3 days. He climbed up a 30 ft ladder at work this AM, stumbled and could not catch his breath. Denies chest pain or leg swelling. /70, HR 75. He has not checked his temp but has been breaking out in cold sweats. He went to his PCP this AM but with his history, he would not order anything and deferred him to you or the ER. He says these are NOT like his previous STEMI symptoms.     I did confirm with him he is taking his metoprolol 25 mg BID, Brilinta and ASA. HE has not missed any doses.

## 2018-07-24 NOTE — TELEPHONE ENCOUNTER
Needs CMP, CBC, BNP, CPK, lipids, CXR and flu swab.  Change Brillinta to clopidogrel with a 300 mg loading dose.

## 2018-07-25 ENCOUNTER — HOSPITAL ENCOUNTER (OUTPATIENT)
Dept: GENERAL RADIOLOGY | Facility: HOSPITAL | Age: 53
Discharge: HOME | End: 2018-07-25
Attending: INTERNAL MEDICINE | Admitting: INTERNAL MEDICINE

## 2018-07-25 ENCOUNTER — RESULTS ENCOUNTER (OUTPATIENT)
Dept: CARDIOLOGY | Facility: CLINIC | Age: 53
End: 2018-07-25

## 2018-07-25 ENCOUNTER — LAB (OUTPATIENT)
Dept: LAB | Facility: HOSPITAL | Age: 53
End: 2018-07-25

## 2018-07-25 DIAGNOSIS — R06.02 SHORTNESS OF BREATH: ICD-10-CM

## 2018-07-25 LAB
ALBUMIN SERPL-MCNC: 4.41 G/DL (ref 3.2–4.8)
ALBUMIN/GLOB SERPL: 1.4 G/DL (ref 1.5–2.5)
ALP SERPL-CCNC: 98 U/L (ref 25–100)
ALT SERPL W P-5'-P-CCNC: 52 U/L (ref 7–40)
ANION GAP SERPL CALCULATED.3IONS-SCNC: 3 MMOL/L (ref 3–11)
ARTICHOKE IGE QN: 82 MG/DL (ref 0–130)
AST SERPL-CCNC: 29 U/L (ref 0–33)
BILIRUB SERPL-MCNC: 0.6 MG/DL (ref 0.3–1.2)
BNP SERPL-MCNC: 33 PG/ML (ref 0–100)
BUN BLD-MCNC: 15 MG/DL (ref 9–23)
BUN/CREAT SERPL: 11.3 (ref 7–25)
CALCIUM SPEC-SCNC: 9.4 MG/DL (ref 8.7–10.4)
CHLORIDE SERPL-SCNC: 107 MMOL/L (ref 99–109)
CHOLEST SERPL-MCNC: 120 MG/DL (ref 0–200)
CK SERPL-CCNC: 55 U/L (ref 26–174)
CO2 SERPL-SCNC: 33 MMOL/L (ref 20–31)
CREAT BLD-MCNC: 1.33 MG/DL (ref 0.6–1.3)
DEPRECATED RDW RBC AUTO: 40.1 FL (ref 37–54)
ERYTHROCYTE [DISTWIDTH] IN BLOOD BY AUTOMATED COUNT: 13.2 % (ref 11.3–14.5)
FLUAV SUBTYP SPEC NAA+PROBE: NOT DETECTED
FLUBV RNA ISLT QL NAA+PROBE: NOT DETECTED
GFR SERPL CREATININE-BSD FRML MDRD: 57 ML/MIN/1.73
GLOBULIN UR ELPH-MCNC: 3.1 GM/DL
GLUCOSE BLD-MCNC: 134 MG/DL (ref 70–100)
HCT VFR BLD AUTO: 43.2 % (ref 38.9–50.9)
HDLC SERPL-MCNC: 34 MG/DL (ref 40–60)
HGB BLD-MCNC: 13.9 G/DL (ref 13.1–17.5)
MCH RBC QN AUTO: 27.1 PG (ref 27–31)
MCHC RBC AUTO-ENTMCNC: 32.2 G/DL (ref 32–36)
MCV RBC AUTO: 84.2 FL (ref 80–99)
PLATELET # BLD AUTO: 284 10*3/MM3 (ref 150–450)
PMV BLD AUTO: 11.2 FL (ref 6–12)
POTASSIUM BLD-SCNC: 4.7 MMOL/L (ref 3.5–5.5)
PROT SERPL-MCNC: 7.5 G/DL (ref 5.7–8.2)
RBC # BLD AUTO: 5.13 10*6/MM3 (ref 4.2–5.76)
SODIUM BLD-SCNC: 143 MMOL/L (ref 132–146)
TRIGL SERPL-MCNC: 98 MG/DL (ref 0–150)
WBC NRBC COR # BLD: 6.18 10*3/MM3 (ref 3.5–10.8)

## 2018-07-25 PROCEDURE — 80053 COMPREHEN METABOLIC PANEL: CPT

## 2018-07-25 PROCEDURE — 87502 INFLUENZA DNA AMP PROBE: CPT

## 2018-07-25 PROCEDURE — 80061 LIPID PANEL: CPT

## 2018-07-25 PROCEDURE — 71046 X-RAY EXAM CHEST 2 VIEWS: CPT

## 2018-07-25 PROCEDURE — 82550 ASSAY OF CK (CPK): CPT

## 2018-07-25 PROCEDURE — 36415 COLL VENOUS BLD VENIPUNCTURE: CPT | Performed by: INTERNAL MEDICINE

## 2018-07-25 PROCEDURE — 85027 COMPLETE CBC AUTOMATED: CPT

## 2018-07-25 PROCEDURE — 83880 ASSAY OF NATRIURETIC PEPTIDE: CPT | Performed by: INTERNAL MEDICINE

## 2018-08-08 ENCOUNTER — TELEPHONE (OUTPATIENT)
Dept: CARDIOLOGY | Facility: CLINIC | Age: 53
End: 2018-08-08

## 2018-08-08 NOTE — TELEPHONE ENCOUNTER
Wendy from [place] [facility] called to let us know they made multiple attempts to schedule. The patient was supposed to notify rehab when he could rearrange his work schedule but never contacted them. They closed the referral d/t multiple attempts.

## 2018-08-14 ENCOUNTER — OFFICE VISIT (OUTPATIENT)
Dept: CARDIOLOGY | Facility: CLINIC | Age: 53
End: 2018-08-14

## 2018-08-14 VITALS
HEART RATE: 71 BPM | WEIGHT: 250 LBS | BODY MASS INDEX: 31.08 KG/M2 | HEIGHT: 75 IN | SYSTOLIC BLOOD PRESSURE: 120 MMHG | DIASTOLIC BLOOD PRESSURE: 84 MMHG

## 2018-08-14 DIAGNOSIS — I25.5 ISCHEMIC CARDIOMYOPATHY: ICD-10-CM

## 2018-08-14 DIAGNOSIS — E78.5 HYPERLIPIDEMIA LDL GOAL <70: ICD-10-CM

## 2018-08-14 DIAGNOSIS — E11.9 TYPE 2 DIABETES MELLITUS WITHOUT COMPLICATION, WITHOUT LONG-TERM CURRENT USE OF INSULIN (HCC): ICD-10-CM

## 2018-08-14 DIAGNOSIS — I10 ESSENTIAL HYPERTENSION: ICD-10-CM

## 2018-08-14 DIAGNOSIS — I25.119 CORONARY ARTERY DISEASE INVOLVING NATIVE CORONARY ARTERY OF NATIVE HEART WITH ANGINA PECTORIS (HCC): Primary | ICD-10-CM

## 2018-08-14 PROBLEM — I24.9 ACUTE CORONARY SYNDROME: Status: RESOLVED | Noted: 2018-07-09 | Resolved: 2018-08-14

## 2018-08-14 PROCEDURE — 99214 OFFICE O/P EST MOD 30 MIN: CPT | Performed by: NURSE PRACTITIONER

## 2018-08-14 RX ORDER — LOSARTAN POTASSIUM 25 MG/1
12.5 TABLET ORAL DAILY
Qty: 90 TABLET | Refills: 3 | Status: SHIPPED | OUTPATIENT
Start: 2018-08-14 | End: 2018-12-18 | Stop reason: SDUPTHER

## 2018-08-14 RX ORDER — ASPIRIN 81 MG/1
81 TABLET ORAL DAILY
Qty: 90 TABLET | Refills: 3 | Status: SHIPPED | OUTPATIENT
Start: 2018-08-14 | End: 2018-12-18 | Stop reason: SDUPTHER

## 2018-08-14 RX ORDER — ROSUVASTATIN CALCIUM 20 MG/1
20 TABLET, COATED ORAL DAILY
Qty: 90 TABLET | Refills: 1 | Status: SHIPPED | OUTPATIENT
Start: 2018-08-14 | End: 2018-08-22 | Stop reason: SDUPTHER

## 2018-08-14 RX ORDER — CLOPIDOGREL BISULFATE 75 MG/1
75 TABLET ORAL DAILY
Qty: 90 TABLET | Refills: 3 | Status: SHIPPED | OUTPATIENT
Start: 2018-08-14 | End: 2018-12-10 | Stop reason: SDUPTHER

## 2018-08-14 NOTE — ASSESSMENT & PLAN NOTE
· Keep hemoglobin A1c less than 7  · Consistent low carbohydrate healthy heart diet  · 30 minutes physical activity most days of the week

## 2018-08-14 NOTE — ASSESSMENT & PLAN NOTE
· Continue dual antiplatelet therapy with aspirin and Plavix until 07/30/2019  · Continue statin and beta blocker therapy

## 2018-08-14 NOTE — ASSESSMENT & PLAN NOTE
· Stable NYHA class II symptoms  · Continue metoprolol tartrate 25 mg twice a day  · Discontinue valsartan and  · Start losartan 12.5 mg daily.  Can increase if blood pressure will allow

## 2018-08-14 NOTE — ASSESSMENT & PLAN NOTE
· Hypertension is controlled  · Continue valsartan 80 mg daily  · Continue metoprolol tartrate 25 mg twice a day

## 2018-08-14 NOTE — PROGRESS NOTES
Encounter Date:08/14/2018    Patient ID: Maurice Harvey is a 53 y.o. male who is  and resides in [address] [place] [address].  He is a heavy     CC/Reason for visit:  Coronary Artery Disease            Maurice Harvey returns today for follow-up after recent hospitalization at [facility] for unstable angina.  The patient had no prior cardiac issues but did have a known history of diabetes mellitus not well controlled.  On 7/5/2018 he developed substernal chest pain and was diagnosed with inferior STEMI.  He was transported via ambulance to [facility] and underwent a complex primary PCI of his circumflex and right coronary arteries.  In the catheterization note there was mention of residual high grade LAD disease that was not treated during the initial procedure.  The patient was discharged home and on 7/9/2018 the patient developed recurrent chest pain symptoms similar to how he felt at the time of his STEMI.  He presented to [facility] emergency room where troponin returned positive but no ST elevation was noted on the EKG.  He was taken to the cardiac catheterization lab by Dr. Geovanni Abraham and underwent PCI to his LAD.  Since discharge from the hospital he has now returned back to work.  He is unable to attend cardiac rehabilitation because of a conflicting work schedule.  He denies any further episodes of chest pain.  He has noted lower extremity leg weakness since starting on Lipitor.  The other day he was walking up a steep hill and did get winded but nothing that caused him to have to stop.  He has not taken his valsartan and for the past week because he ran out of it.  He is concerned about the recall of this medication due to it causing possible cancer.  His primary care provider is working to get his diabetes under control.  He was taken off glyburide and placed on Jardiance but has not had a repeat hemoglobin A1c level yet.  He is  "still taking dual antiplatelet therapy with aspirin and Plavix and tolerating without any reports of active or overt bleeding.    Review of Systems   Constitution: Positive for chills and malaise/fatigue.   Cardiovascular: Positive for near-syncope.   Endocrine: Positive for cold intolerance, polydipsia and polyuria.   Musculoskeletal: Positive for joint swelling, muscle weakness and myalgias.   Genitourinary: Positive for frequency.   Neurological: Positive for headaches.       The patient's past medical, social, family history and ROS reviewed in the patient's electronic medical record.    Allergies  Codeine and Lisinopril    Outpatient Prescriptions Marked as Taking for the 8/14/18 encounter (Office Visit) with Bernie Thurman APRN   Medication Sig Dispense Refill   • aspirin 81 MG EC tablet Take 1 tablet by mouth Daily. 90 tablet 3   • clopidogrel (PLAVIX) 75 MG tablet Take 1 tablet by mouth Daily. 90 tablet 3   • metFORMIN ER (GLUCOPHAGE-XR) 750 MG 24 hr tablet Take 750 mg by mouth 2 (Two) Times a Day.     • metoprolol tartrate (LOPRESSOR) 25 MG tablet Take 1 tablet by mouth Every 12 (Twelve) Hours. 180 tablet 1   • nitroglycerin (NITROSTAT) 0.4 MG SL tablet Place 1 under the tongue as needed for chest pain, may repeat every 5 mins for up three doses 25 tablet 10   • [DISCONTINUED] aspirin 81 MG EC tablet Take 81 mg by mouth Daily.     • [DISCONTINUED] atorvastatin (LIPITOR) 40 MG tablet Take 40 mg by mouth Daily.     • [DISCONTINUED] clopidogrel (PLAVIX) 75 MG tablet Take 1 tablet by mouth Daily. 93 tablet 0   • [DISCONTINUED] metoprolol tartrate (LOPRESSOR) 25 MG tablet Take 1 tablet by mouth Every 12 (Twelve) Hours. 180 tablet 1   • [DISCONTINUED] valsartan (DIOVAN) 80 MG tablet Take 1 tablet by mouth Daily. 30 tablet 10         Blood pressure 120/84, pulse 71, height 190.5 cm (75\"), weight 113 kg (250 lb).  Body mass index is 31.25 kg/m².  There were no vitals filed for this visit.    Physical Exam "   Constitutional: He is oriented to person, place, and time. He appears well-developed and well-nourished.   HENT:   Head: Normocephalic and atraumatic.   Eyes: Pupils are equal, round, and reactive to light. No scleral icterus.   Neck: No JVD present. Carotid bruit is not present. No thyromegaly present.   Cardiovascular: Normal rate, regular rhythm, S1 normal and S2 normal.  Exam reveals no gallop.    No murmur heard.  Pulmonary/Chest: Effort normal and breath sounds normal.   Abdominal: Soft. There is no hepatosplenomegaly. There is no tenderness.   Neurological: He is alert and oriented to person, place, and time.   Skin: Skin is warm and dry. No cyanosis. Nails show no clubbing.   Psychiatric: He has a normal mood and affect. His behavior is normal.       Data Review:     Procedures    Lab Results   Component Value Date    CHOL 120 07/25/2018    TRIG 98 07/25/2018    HDL 34 (L) 07/25/2018    LDL 82 07/25/2018    AST 29 07/25/2018    ALT 52 (H) 07/25/2018       Lab Results   Component Value Date    HGBA1C 9.00 (H) 07/10/2018            Problem List Items Addressed This Visit        Cardiovascular and Mediastinum    Coronary artery disease involving native coronary artery of native heart with angina pectoris (CMS/HCC) - Primary    Overview     · Cardiac catheterization for inferior STEMI @ [facility] (07/05/2018):  100% occlusions of the RCA and left circumflex status post PEPE. Severe distal LAD and first diagonal disease noted.  · Cardiac catheterization for recurrent chest pain 4 days post STEMI (7/9/18): Severe 1-vessel CAD involving the mid/distal LAD and large first diagonal branch. Successful PCI of the mid LAD and distal RCA using PEPE. Successful PCI of D1 using a Xience 2.5 x 23 mm PEPE.  · Echo (7/10/18): LVEF 42% with inferolateral hypokinesis. No significant valvular abnormality.         Current Assessment & Plan     · Continue dual antiplatelet therapy with aspirin and Plavix  until 07/30/2019  · Continue statin and beta blocker therapy         Relevant Medications    metoprolol tartrate (LOPRESSOR) 25 MG tablet    clopidogrel (PLAVIX) 75 MG tablet    aspirin 81 MG EC tablet    Essential hypertension    Current Assessment & Plan     · Hypertension is controlled  · Continue valsartan 80 mg daily  · Continue metoprolol tartrate 25 mg twice a day         Relevant Medications    metoprolol tartrate (LOPRESSOR) 25 MG tablet    losartan (COZAAR) 25 MG tablet    Hyperlipidemia LDL goal <70    Overview     · High intensity statin therapy indicated given presence of CAD  · Patient reports myalgias with Lipitor         Current Assessment & Plan     · Discontinue Lipitor and start Crestor 20 mg daily  · CMP and lipid profile in 6 weeks         Relevant Medications    rosuvastatin (CRESTOR) 20 MG tablet    Other Relevant Orders    Comprehensive Metabolic Panel    Lipid Panel    Ischemic cardiomyopathy    Overview     · Inferior lateral STEMI status post PCI of the RCA and left circumflex, 7/5/18  · Echo (7/10/18): LVEF 42% with inferolateral hypokinesis. No significant valvular abnormality.         Current Assessment & Plan     · Stable NYHA class II symptoms  · Continue metoprolol tartrate 25 mg twice a day  · Discontinue valsartan and  · Start losartan 12.5 mg daily.  Can increase if blood pressure will allow         Relevant Medications    metoprolol tartrate (LOPRESSOR) 25 MG tablet    clopidogrel (PLAVIX) 75 MG tablet    losartan (COZAAR) 25 MG tablet       Endocrine    Type 2 diabetes mellitus without complication, without long-term current use of insulin (CMS/[facility])    Current Assessment & Plan     · Keep hemoglobin A1c less than 7  · Consistent low carbohydrate healthy heart diet  · 30 minutes physical activity most days of the week         Relevant Medications    Empagliflozin (JARDIANCE) 25 MG tablet        The patient has not experienced any recurrent chest pain symptoms and appears to have  resumed his normal activities.  I highly encouraged him to attend cardiac rehabilitation but understand if this can flex with him needing to work.  I will discontinue his valsartan.  He is relatively normotensive today but given the fact his EF was mildly reduced and he has diabetes mellitus I will place him on losartan 12.5 mg daily.  Of note he is allergic to lisinopril.  He is likely experiencing some myalgias from the Lipitor so I will discontinue it and start him on Crestor 20 mg daily.  I will obtain a CMP and lipid profile in 6 weeks.  I discussed with him the importance of keeping a hemoglobin A1c level less than 7 and closer to 6 the better for stroke and heart attack prevention.  He should follow consistent low carbohydrate healthy heart diet.  He should do physical activity for at least 30 minutes 5 days of the week.  It him to come back in 4 months for reassessment.       · Discontinue valsartan and and start losartan 12.5 mg daily  · Discontinue Lipitor and start Crestor 20 mg daily  · CMP and lipid profile in 6 weeks  · Hemoglobin A1c goal less than 7.0  · Consistent low carbohydrate healthy heart diet  · Increase physical activities 30 minutes most days of the week.  Refer patient attend cardiac rehabilitation  · Follow-up 4 months or sooner if needed    JONAH Flynn  8/14/2018

## 2018-08-22 ENCOUNTER — TELEPHONE (OUTPATIENT)
Dept: CARDIOLOGY | Facility: CLINIC | Age: 53
End: 2018-08-22

## 2018-08-22 DIAGNOSIS — E78.5 HYPERLIPIDEMIA LDL GOAL <70: Primary | ICD-10-CM

## 2018-08-22 RX ORDER — ROSUVASTATIN CALCIUM 40 MG/1
40 TABLET, COATED ORAL DAILY
Qty: 90 TABLET | Refills: 0 | Status: SHIPPED | OUTPATIENT
Start: 2018-08-22 | End: 2018-11-29 | Stop reason: SDUPTHER

## 2018-08-22 NOTE — TELEPHONE ENCOUNTER
I called the patient to review lab results. Per JONAH Flynn increase Crestor to 40 mg daily. Left message with this information. Lab orders mailed.

## 2018-10-11 DIAGNOSIS — I25.119 CORONARY ARTERY DISEASE INVOLVING NATIVE CORONARY ARTERY OF NATIVE HEART WITH ANGINA PECTORIS (HCC): ICD-10-CM

## 2018-10-11 RX ORDER — NITROGLYCERIN 0.4 MG/1
TABLET SUBLINGUAL
Qty: 25 TABLET | Refills: 5 | Status: SHIPPED | OUTPATIENT
Start: 2018-10-11 | End: 2018-12-18 | Stop reason: SDUPTHER

## 2018-10-15 RX ORDER — CLOPIDOGREL BISULFATE 75 MG/1
75 TABLET ORAL DAILY
Qty: 90 TABLET | Refills: 3 | Status: SHIPPED | OUTPATIENT
Start: 2018-10-15 | End: 2018-12-18 | Stop reason: SDUPTHER

## 2018-11-29 RX ORDER — ROSUVASTATIN CALCIUM 40 MG/1
40 TABLET, COATED ORAL DAILY
Qty: 90 TABLET | Refills: 0 | Status: SHIPPED | OUTPATIENT
Start: 2018-11-29 | End: 2018-12-18 | Stop reason: SDUPTHER

## 2018-12-10 RX ORDER — CLOPIDOGREL BISULFATE 75 MG/1
75 TABLET ORAL DAILY
Qty: 90 TABLET | Refills: 3 | Status: SHIPPED | OUTPATIENT
Start: 2018-12-10 | End: 2018-12-18 | Stop reason: SDUPTHER
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
## 2018-12-18 ENCOUNTER — OFFICE VISIT (OUTPATIENT)
Dept: CARDIOLOGY | Facility: CLINIC | Age: 53
End: 2018-12-18

## 2018-12-18 VITALS
DIASTOLIC BLOOD PRESSURE: 80 MMHG | BODY MASS INDEX: 31.85 KG/M2 | HEIGHT: 74 IN | SYSTOLIC BLOOD PRESSURE: 126 MMHG | HEART RATE: 88 BPM | WEIGHT: 248.2 LBS

## 2018-12-18 DIAGNOSIS — E78.5 HYPERLIPIDEMIA LDL GOAL <70: ICD-10-CM

## 2018-12-18 DIAGNOSIS — I25.119 CORONARY ARTERY DISEASE INVOLVING NATIVE CORONARY ARTERY OF NATIVE HEART WITH ANGINA PECTORIS (HCC): Primary | ICD-10-CM

## 2018-12-18 DIAGNOSIS — I25.5 ISCHEMIC CARDIOMYOPATHY: ICD-10-CM

## 2018-12-18 DIAGNOSIS — E11.9 TYPE 2 DIABETES MELLITUS WITHOUT COMPLICATION, WITHOUT LONG-TERM CURRENT USE OF INSULIN (HCC): ICD-10-CM

## 2018-12-18 PROBLEM — I10 ESSENTIAL HYPERTENSION: Status: RESOLVED | Noted: 2018-07-09 | Resolved: 2018-12-18

## 2018-12-18 PROCEDURE — 99214 OFFICE O/P EST MOD 30 MIN: CPT | Performed by: INTERNAL MEDICINE

## 2018-12-18 RX ORDER — LOSARTAN POTASSIUM 25 MG/1
25 TABLET ORAL DAILY
Qty: 90 TABLET | Refills: 3 | Status: SHIPPED | OUTPATIENT
Start: 2018-12-18

## 2018-12-18 RX ORDER — CLOPIDOGREL BISULFATE 75 MG/1
75 TABLET ORAL DAILY
Qty: 90 TABLET | Refills: 3 | Status: SHIPPED | OUTPATIENT
Start: 2018-12-18 | End: 2019-12-31

## 2018-12-18 RX ORDER — ROSUVASTATIN CALCIUM 40 MG/1
40 TABLET, COATED ORAL DAILY
Qty: 90 TABLET | Refills: 3 | Status: SHIPPED | OUTPATIENT
Start: 2018-12-18 | End: 2019-12-31

## 2018-12-18 RX ORDER — NITROGLYCERIN 0.4 MG/1
TABLET SUBLINGUAL
Qty: 25 TABLET | Refills: 5 | Status: SHIPPED | OUTPATIENT
Start: 2018-12-18

## 2018-12-18 RX ORDER — ASPIRIN 81 MG/1
81 TABLET ORAL DAILY
Qty: 90 TABLET | Refills: 3 | Status: SHIPPED | OUTPATIENT
Start: 2018-12-18

## 2018-12-18 RX ORDER — ATORVASTATIN CALCIUM 40 MG/1
40 TABLET, FILM COATED ORAL DAILY
Refills: 2 | COMMUNITY
Start: 2018-10-05 | End: 2018-12-18

## 2018-12-18 NOTE — PROGRESS NOTES
Encounter Date:12/18/2018    Patient ID: Maurice Harvey is a  53 y.o. male who resides in [place], [place]. He is a heavy .    CC/Reason for visit:  Coronary Artery Disease             Problem List Items Addressed This Visit        Cardiology Problems    Coronary artery disease involving native coronary artery of native heart with angina pectoris (CMS/HCC) - Primary    Overview     · Cardiac catheterization for inferior STEMI @ [facility] (07/05/2018):  100% occlusions of the RCA and left circumflex status post PEPE. Severe distal LAD and first diagonal disease noted.  · Cardiac catheterization for recurrent chest pain 4 days post STEMI (7/9/18): Severe 1-vessel CAD involving the mid/distal LAD and large first diagonal branch. Successful PCI of the mid LAD and distal RCA using PEPE. Successful PCI of D1 using a Xience 2.5 x 23 mm PEPE.  · Echo (7/10/18): LVEF 42% with inferolateral hypokinesis. No significant valvular abnormality.         Current Assessment & Plan     · Asymptomatic  · Continue DAPT, beta blocker, and statin therapy         Relevant Medications    clopidogrel (PLAVIX) 75 MG tablet    metoprolol tartrate (LOPRESSOR) 25 MG tablet    nitroglycerin (NITROSTAT) 0.4 MG SL tablet    aspirin 81 MG EC tablet    Ischemic cardiomyopathy    Overview     · Inferior lateral STEMI status post PCI of the RCA and left circumflex, 7/5/18  · Echo (7/10/18): LVEF 42% with inferolateral hypokinesis. No significant valvular abnormality.         Current Assessment & Plan     · Functional class II symptoms  · Continue metoprolol and losartan         Relevant Medications    clopidogrel (PLAVIX) 75 MG tablet    metoprolol tartrate (LOPRESSOR) 25 MG tablet    losartan (COZAAR) 25 MG tablet    nitroglycerin (NITROSTAT) 0.4 MG SL tablet    Hyperlipidemia LDL goal <70    Overview     · High intensity statin therapy indicated given presence of CAD  · Patient reports myalgias with  Lipitor         Current Assessment & Plan     · Well controlled  · Continue Crestor         Relevant Medications    rosuvastatin (CRESTOR) 40 MG tablet       Other    Type 2 diabetes mellitus without complication, without long-term current use of insulin (CMS/[facility])    Current Assessment & Plan     · ARB and statin therapy indicated given diabetic status                    · Increase losartan to 25 mg daily  Return in about 12 months (around 12/18/2019) for Follow-up with [facility] only.        Maurice Harvey returns to the clinic for a 4 month follow-up after his hospitalization for unstable angina. He has a history of coronary artery disease, ischemic cardiomyopathy, and cardiac risk factors. He has had a history of heart attack on July 5, 2018 and the following Monday. He reports that he has been feeling better since then, and he has had intermittent chest pain, but not bothersome to him. He continues to go back to work without limitations. He is in the midst of a divorce right now, which has caused him some stress, as she is trying to acquire some of the property that he owns. He also has had an upper respiratory infection for the past two weeks. He denies ever being a smoker.      Review of Systems   Constitution: Negative for weakness and malaise/fatigue.   Eyes: Negative for vision loss in left eye and vision loss in right eye.   Cardiovascular: Positive for chest pain. Negative for dyspnea on exertion, near-syncope, orthopnea, palpitations, paroxysmal nocturnal dyspnea and syncope.   Endocrine: Positive for cold intolerance.   Musculoskeletal: Positive for arthritis, muscle cramps, muscle weakness and myalgias.   Neurological: Positive for dizziness and loss of balance. Negative for brief paralysis, excessive daytime sleepiness, focal weakness, numbness and paresthesias.   All other systems reviewed and are negative.      The patient's past medical, social, family history and ROS reviewed in the patient's  "electronic medical record.    Allergies  Codeine; Atorvastatin; and Lisinopril    Outpatient Medications Marked as Taking for the 12/18/18 encounter (Office Visit) with Lio Abraham IV, MD   Medication Sig Dispense Refill   • aspirin 81 MG EC tablet Take 1 tablet by mouth Daily. 90 tablet 3   • clopidogrel (PLAVIX) 75 MG tablet Take 1 tablet by mouth Daily. 90 tablet 3   • losartan (COZAAR) 25 MG tablet Take 1 tablet by mouth Daily. 90 tablet 3   • metFORMIN ER (GLUCOPHAGE-XR) 750 MG 24 hr tablet Take 750 mg by mouth 2 (Two) Times a Day.     • metoprolol tartrate (LOPRESSOR) 25 MG tablet Take 1 tablet by mouth Every 12 (Twelve) Hours. 180 tablet 1   • nitroglycerin (NITROSTAT) 0.4 MG SL tablet Place 1 under the tongue as needed for chest pain, may repeat every 5 mins for up three doses 25 tablet 5   • [DISCONTINUED] aspirin 81 MG EC tablet Take 1 tablet by mouth Daily. 90 tablet 3   • [DISCONTINUED] atorvastatin (LIPITOR) 40 MG tablet Take 40 mg by mouth Daily.  2   • [DISCONTINUED] clopidogrel (PLAVIX) 75 MG tablet TAKE 1 TABLET BY MOUTH DAILY. 90 tablet 3   • [DISCONTINUED] Empagliflozin (JARDIANCE) 25 MG tablet Take 25 mg by mouth Daily. 90 tablet 3   • [DISCONTINUED] losartan (COZAAR) 25 MG tablet Take 0.5 tablets by mouth Daily. 90 tablet 3   • [DISCONTINUED] metoprolol tartrate (LOPRESSOR) 25 MG tablet Take 1 tablet by mouth Every 12 (Twelve) Hours. 180 tablet 1   • [DISCONTINUED] nitroglycerin (NITROSTAT) 0.4 MG SL tablet Place 1 under the tongue as needed for chest pain, may repeat every 5 mins for up three doses 25 tablet 5           Blood pressure 126/80, pulse 88, height 188 cm (74\"), weight 113 kg (248 lb 3.2 oz).  Body mass index is 31.87 kg/m².      Physical Exam   Constitutional: He is oriented to person, place, and time. He appears well-developed and well-nourished.   HENT:   Head: Normocephalic and atraumatic.   Eyes: Pupils are equal, round, and reactive to light. No scleral icterus. "   Neck: No JVD present. Carotid bruit is not present. No thyromegaly present.   Cardiovascular: Normal rate and regular rhythm. Exam reveals no gallop.   No murmur heard.  Pulmonary/Chest: Effort normal and breath sounds normal.   Abdominal: Soft. He exhibits no distension. There is no hepatosplenomegaly.   Musculoskeletal: He exhibits no edema.   Neurological: He is alert and oriented to person, place, and time.   Skin: Skin is warm and dry.   Psychiatric: He has a normal mood and affect. His behavior is normal.       Data Review:     Procedures    Lab Results   Component Value Date    CHOL 138 09/25/2018    TRIG 120 09/25/2018    HDL 43 09/25/2018    LDL 74 09/25/2018     Lab Results   Component Value Date    GLUCOSE 175 (H) 09/25/2018    BUN 14 09/25/2018    CREATININE 0.94 09/25/2018     09/25/2018    K 4.4 09/25/2018    AST 18 09/25/2018    ALT 27 09/25/2018     Lab Results   Component Value Date    WBC 6.18 07/25/2018    HGB 13.9 07/25/2018    HCT 43.2 07/25/2018    MCV 84.2 07/25/2018     07/25/2018     TSH (09/25/2018): 1.63     Lab Results   Component Value Date    HGBA1C 8.5% 09/25/2018         Scribed for Lio Abraham IV, MD by Cami Thomas. 12/18/2018  5:45 PM    Lio Abraham IV, MD  12/18/2018

## 2019-07-08 DIAGNOSIS — I25.119 CORONARY ARTERY DISEASE INVOLVING NATIVE CORONARY ARTERY OF NATIVE HEART WITH ANGINA PECTORIS (HCC): ICD-10-CM

## 2019-12-31 DIAGNOSIS — E78.5 HYPERLIPIDEMIA LDL GOAL <70: ICD-10-CM

## 2019-12-31 DIAGNOSIS — I25.119 CORONARY ARTERY DISEASE INVOLVING NATIVE CORONARY ARTERY OF NATIVE HEART WITH ANGINA PECTORIS (HCC): ICD-10-CM

## 2019-12-31 RX ORDER — ROSUVASTATIN CALCIUM 40 MG/1
40 TABLET, COATED ORAL DAILY
Qty: 90 TABLET | Refills: 3 | Status: SHIPPED | OUTPATIENT
Start: 2019-12-31

## 2019-12-31 RX ORDER — CLOPIDOGREL BISULFATE 75 MG/1
75 TABLET ORAL DAILY
Qty: 90 TABLET | Refills: 3 | Status: SHIPPED | OUTPATIENT
Start: 2019-12-31

## 2022-03-23 ENCOUNTER — TELEPHONE (OUTPATIENT)
Dept: ORTHOPEDIC SURGERY | Facility: CLINIC | Age: 57
End: 2022-03-23

## 2022-03-23 NOTE — TELEPHONE ENCOUNTER
Also could not leave a message on the number provided in his chart.  The phone just rang with no voicemail option.    A-

## 2022-03-23 NOTE — TELEPHONE ENCOUNTER
Attempted to contact Mr. Harvey this morning to reschedule his new patient appt with Nuzhat on 3/24/22.  Need to see if we can move him to later in the day as Nuzhat will be out of the office part of the day. Will try to reach him again later.    A-

## 2022-03-24 ENCOUNTER — OFFICE VISIT (OUTPATIENT)
Dept: ORTHOPEDIC SURGERY | Facility: CLINIC | Age: 57
End: 2022-03-24

## 2022-03-24 VITALS
SYSTOLIC BLOOD PRESSURE: 140 MMHG | DIASTOLIC BLOOD PRESSURE: 88 MMHG | BODY MASS INDEX: 31.95 KG/M2 | HEIGHT: 74 IN | WEIGHT: 249 LBS

## 2022-03-24 DIAGNOSIS — S46.019A TRAUMATIC ROTATOR CUFF TEAR, INITIAL ENCOUNTER: Primary | ICD-10-CM

## 2022-03-24 PROCEDURE — 99204 OFFICE O/P NEW MOD 45 MIN: CPT | Performed by: PHYSICIAN ASSISTANT

## 2022-03-24 RX ORDER — GLIPIZIDE 5 MG/1
5 TABLET ORAL DAILY
COMMUNITY
Start: 2022-03-14
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
## 2022-03-28 ENCOUNTER — APPOINTMENT (OUTPATIENT)
Dept: OTHER | Facility: HOSPITAL | Age: 57
End: 2022-03-28

## 2022-03-28 DIAGNOSIS — Z00.6 EXAMINATION FOR NORMAL COMPARISON FOR CLINICAL RESEARCH: ICD-10-CM

## 2022-04-28 ENCOUNTER — TELEPHONE (OUTPATIENT)
Dept: ORTHOPEDIC SURGERY | Facility: CLINIC | Age: 57
End: 2022-04-28

## 2022-04-28 ENCOUNTER — OFFICE VISIT (OUTPATIENT)
Dept: ORTHOPEDIC SURGERY | Facility: CLINIC | Age: 57
End: 2022-04-28

## 2022-04-28 VITALS
SYSTOLIC BLOOD PRESSURE: 152 MMHG | WEIGHT: 252.2 LBS | BODY MASS INDEX: 32.37 KG/M2 | HEIGHT: 74 IN | DIASTOLIC BLOOD PRESSURE: 84 MMHG

## 2022-04-28 DIAGNOSIS — M19.011 ARTHRITIS OF RIGHT ACROMIOCLAVICULAR JOINT: ICD-10-CM
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
## 2022-04-28 DIAGNOSIS — S46.019D TRAUMATIC ROTATOR CUFF TEAR, SUBSEQUENT ENCOUNTER: Primary | ICD-10-CM

## 2022-04-28 DIAGNOSIS — S43.003A SUBLUXATION OF TENDON OF LONG HEAD OF BICEPS: ICD-10-CM

## 2022-04-28 PROCEDURE — 99214 OFFICE O/P EST MOD 30 MIN: CPT | Performed by: PHYSICIAN ASSISTANT
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
## 2022-04-28 NOTE — PROGRESS NOTES
I have reviewed the notes, assessments, and/or procedures performed by Nuzhat Pimentel PA-C, I concur with her documentation of Maurice Harvey.      Patient seen and examined with Nuzhat this afternoon.  Briefly, he is a patient who has a work-related injury to his right shoulder.  Continues have difficulty at night sleeping.  Has had prior surgery on the right upper extremity and what sounds like subpectoral biceps tenodesis.  Physical therapy did not help him.  Examination today shows him to be strong with full range of motion and no significant irritability of his subscap supra or infraspinatus with testing.  He is tender at the AC joint to palpation and tender with crossover adduction.  No Thaddeus sign noted.  Review of his MRI shows what appears to be a dislocated biceps tendon and an upper subscap tear and a large full-thickness tear of the supraspinatus with significant edema at the AC joint.  We had a lengthy discussion with the patient regarding treatment options and alternatives.  At this point, we have asked the patient to track down his operative note and notes from his prior surgery 5 years ago done at [facility] in [facility].  The planned procedure on his right shoulder will be arthroscopy of the right shoulder, we will closely assess the presence or absence of the long head of the biceps tendon which appears to be present on the MRI but given his history of tenodesis, should not be present when we scoped his shoulder.  If he indeed has a significant tear to the subscap, this will be repaired subacromially and we will then repair the supraspinatus tear subacromial as well.  We will then make an incision and perform an open distal clavicle resection.  The risk, benefits, potential hazards, typical recovery and rehab as well as reasonable alternatives were discussed with him this afternoon.  He had the opportunity to ask questions and wishes to proceed with scheduling.

## 2022-05-27 ENCOUNTER — PREP FOR SURGERY (OUTPATIENT)
Dept: OTHER | Facility: HOSPITAL | Age: 57
End: 2022-05-27

## 2022-05-27 DIAGNOSIS — M19.011 ARTHRITIS OF RIGHT ACROMIOCLAVICULAR JOINT: ICD-10-CM

## 2022-05-27 DIAGNOSIS — S43.003A SUBLUXATION OF TENDON OF LONG HEAD OF BICEPS: ICD-10-CM

## 2022-05-27 DIAGNOSIS — S46.019D TRAUMATIC ROTATOR CUFF TEAR, SUBSEQUENT ENCOUNTER: Primary | ICD-10-CM
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
## 2022-05-27 RX ORDER — CEFAZOLIN SODIUM IN 0.9 % NACL 2 G/100 ML
2 PLASTIC BAG, INJECTION (ML) INTRAVENOUS ONCE
Status: CANCELLED | OUTPATIENT
Start: 2022-05-27 | End: 2022-05-27

## 2022-05-27 RX ORDER — CEFAZOLIN SODIUM IN 0.9 % NACL 3 G/100 ML
3 INTRAVENOUS SOLUTION, PIGGYBACK (ML) INTRAVENOUS ONCE
Status: CANCELLED | OUTPATIENT
Start: 2022-05-27 | End: 2022-05-27

## 2022-05-27 RX ORDER — ACETAMINOPHEN 500 MG
1000 TABLET ORAL ONCE
Status: CANCELLED | OUTPATIENT
Start: 2022-05-27 | End: 2022-05-27
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
## 2022-06-20 ENCOUNTER — OFFICE VISIT (OUTPATIENT)
Dept: CARDIOLOGY | Facility: CLINIC | Age: 57
End: 2022-06-20

## 2022-06-20 VITALS
DIASTOLIC BLOOD PRESSURE: 72 MMHG | WEIGHT: 255.2 LBS | SYSTOLIC BLOOD PRESSURE: 142 MMHG | HEART RATE: 83 BPM | BODY MASS INDEX: 31.73 KG/M2 | HEIGHT: 75 IN | OXYGEN SATURATION: 98 %

## 2022-06-20 DIAGNOSIS — E78.5 HYPERLIPIDEMIA LDL GOAL <70: ICD-10-CM

## 2022-06-20 DIAGNOSIS — I25.5 ISCHEMIC CARDIOMYOPATHY: ICD-10-CM

## 2022-06-20 DIAGNOSIS — I25.119 CORONARY ARTERY DISEASE INVOLVING NATIVE CORONARY ARTERY OF NATIVE HEART WITH ANGINA PECTORIS: Primary | ICD-10-CM

## 2022-06-20 PROCEDURE — 99203 OFFICE O/P NEW LOW 30 MIN: CPT | Performed by: NURSE PRACTITIONER

## 2022-06-20 PROCEDURE — 93000 ELECTROCARDIOGRAM COMPLETE: CPT | Performed by: NURSE PRACTITIONER

## 2022-06-20 RX ORDER — METOPROLOL SUCCINATE 50 MG/1
50 TABLET, EXTENDED RELEASE ORAL DAILY
Qty: 90 TABLET | Refills: 3 | Status: SHIPPED | OUTPATIENT
Start: 2022-06-20

## 2022-06-20 NOTE — ASSESSMENT & PLAN NOTE
· No signs or symptoms of angina.  Patient cleared to have right rotator cuff repair with Dr. Bishop.  He will need to hold Plavix 5 days before procedure and resume postprocedure.  He should not hold his aspirin  · Continue aspirin 81 mg daily  · Sublingual nitroglycerin as needed for any episodes of angina

## 2022-06-20 NOTE — ASSESSMENT & PLAN NOTE
· Change metoprolol to tartrate to metoprolol succinate 50 mg daily  · Continue losartan 25 mg daily  · Stable NYHA class I symptoms

## 2023-05-11 RX ORDER — METOPROLOL SUCCINATE 50 MG/1
50 TABLET, EXTENDED RELEASE ORAL DAILY
Qty: 90 TABLET | Refills: 3 | Status: SHIPPED | OUTPATIENT
Start: 2023-05-11

## 2023-12-26 NOTE — PROGRESS NOTES
"    Cardiology Outpatient Visit      Identification: Maurice Harvey is a 58 y.o. male who resides in [place], [place].     Reason for visit:  CAD      Low Lyons returns to the office today for follow-up.  He is doing well and denies anginal symptoms.  He has been off work for the last 6 months due to sciatica.  He is being evaluated at  and has undergone several injections and physical therapy for this.    He has not had recent lab work performed.  Denies side effects from his DAPT.  He does report getting myalgias with rosuvastatin 40 mg daily, the same way he did with atorvastatin.    ROS    Allergies   Allergen Reactions    Codeine Anaphylaxis    Oxycodone Itching    Atorvastatin Myalgia    Lisinopril Cough         Current Outpatient Medications   Medication Instructions    aspirin 81 mg, Oral, Daily    clopidogrel (PLAVIX) 75 mg, Oral, Daily    empagliflozin (JARDIANCE) 25 mg, Oral, Daily    glipizide (GLUCOTROL) 5 mg, Oral, Daily    losartan (COZAAR) 25 mg, Oral, Daily    metoprolol succinate XL (TOPROL-XL) 50 mg, Oral, Daily    nitroglycerin (NITROSTAT) 0.4 MG SL tablet Place 1 under the tongue as needed for chest pain, may repeat every 5 mins for up three doses    rosuvastatin (CRESTOR) 40 mg, Oral, Daily         Objective     /76 (BP Location: Left arm, Patient Position: Sitting, Cuff Size: Adult)   Pulse 87   Ht 189.2 cm (74.49\")   Wt 121 kg (266 lb)   SpO2 96%   BMI 33.71 kg/m²       Constitutional:       Appearance: Healthy appearance.   Eyes:      General: No scleral icterus.  Neck:      Thyroid: No thyroid mass.      Vascular: No carotid bruit or JVD. JVD normal.   Pulmonary:      Effort: Pulmonary effort is normal.      Breath sounds: Normal breath sounds.   Cardiovascular:      Normal rate. Regular rhythm.      Murmurs: There is no murmur.      No gallop.    Edema:     Peripheral edema absent.   Skin:     General: Skin is warm. There is no cyanosis.   Neurological:      " General: No focal deficit present.      Mental Status: Alert.   Psychiatric:         Attention and Perception: Attention normal.         Result Review  (reviewed with patient):                  Assessment     Diagnoses and all orders for this visit:    1. Coronary artery disease involving native coronary artery of native heart with angina pectoris (Primary)  Overview:  [facility]  100% occlusions of the RCA and left circumflex status post PEPE. Severe distal LAD and first diagonal disease noted.  Cardiac catheterization for recurrent chest pain 4 days post STEMI (7/9/18): Severe 1-vessel CAD involving the mid/distal LAD and large first diagonal branch. Successful PCI of the mid LAD and distal RCA using PEPE. Successful PCI of D1 using a Xience 2.5 x 23 mm PEPE.  Echo (7/10/18): LVEF 42% with inferolateral hypokinesis. No significant valvular abnormality.    Assessment & Plan:  Asymptomatic  Continue DAPT, beta blocker, and statin therapy    Orders:  -     CBC (No Diff); Future  -     metoprolol succinate XL (TOPROL-XL) 50 MG 24 hr tablet; Take 1 tablet by mouth Daily.  Dispense: 90 tablet; Refill: 3  -     nitroglycerin (NITROSTAT) 0.4 MG SL tablet; Place 1 under the tongue as needed for chest pain, may repeat every 5 mins for up three doses  Dispense: 25 tablet; Refill: 5    2. Hyperlipidemia LDL goal <70  Overview:  High intensity statin therapy indicated given presence of CAD  Intolerant to atorvastatin and max dose rosuvastatin (myalgias)    Assessment & Plan:  Does not tolerate rosuvastatin 40 mg daily  Obtain direct LDL and CMP today  If LDL >70, consider ezetimibe or Repatha    Orders:  -     Comprehensive Metabolic Panel; Future  -     LDL Cholesterol, Direct; Future  -     rosuvastatin (CRESTOR) 40 MG tablet; Take 1 tablet by mouth Daily.  Dispense: 90 tablet; Refill: 3    3. Type 2 diabetes mellitus with hyperglycemia, without  long-term current use of insulin  Assessment & Plan:  Obtain hemoglobin A1c   Continue empagliflozin, losartan, and statin therapy due to diabetic status    Orders:  -     Hemoglobin A1c; Future  -     empagliflozin (Jardiance) 25 MG tablet tablet; Take 1 tablet by mouth Daily.  Dispense: 90 tablet; Refill: 3  -     losartan (COZAAR) 25 MG tablet; Take 1 tablet by mouth Daily.  Dispense: 90 tablet; Refill: 3  -     glipizide (GLUCOTROL) 5 MG tablet; Take 1 tablet by mouth Daily.          Plan   Obtain CMP, direct LDL, CBC, hemoglobin A1c      Follow-up   Return in about 1 year (around 1/2/2025).        Geovanni Abraham MD, FACC, FSCAI  1/2/2024

## 2024-01-02 ENCOUNTER — LAB (OUTPATIENT)
Dept: LAB | Facility: HOSPITAL | Age: 59
End: 2024-01-02
Payer: MEDICAID

## 2024-01-02 ENCOUNTER — OFFICE VISIT (OUTPATIENT)
Dept: CARDIOLOGY | Facility: CLINIC | Age: 59
End: 2024-01-02
Payer: MEDICAID

## 2024-01-02 VITALS
WEIGHT: 266 LBS | DIASTOLIC BLOOD PRESSURE: 76 MMHG | BODY MASS INDEX: 34.14 KG/M2 | SYSTOLIC BLOOD PRESSURE: 132 MMHG | HEIGHT: 74 IN | OXYGEN SATURATION: 96 % | HEART RATE: 87 BPM

## 2024-01-02 DIAGNOSIS — E11.65 TYPE 2 DIABETES MELLITUS WITH HYPERGLYCEMIA, WITHOUT LONG-TERM CURRENT USE OF INSULIN: ICD-10-CM

## 2024-01-02 DIAGNOSIS — E78.5 HYPERLIPIDEMIA LDL GOAL <70: ICD-10-CM

## 2024-01-02 DIAGNOSIS — I25.119 CORONARY ARTERY DISEASE INVOLVING NATIVE CORONARY ARTERY OF NATIVE HEART WITH ANGINA PECTORIS: Primary | ICD-10-CM

## 2024-01-02 DIAGNOSIS — I25.119 CORONARY ARTERY DISEASE INVOLVING NATIVE CORONARY ARTERY OF NATIVE HEART WITH ANGINA PECTORIS: ICD-10-CM

## 2024-01-02 PROBLEM — S46.019D: Status: RESOLVED | Noted: 2022-05-27 | Resolved: 2024-01-02

## 2024-01-02 LAB
ALBUMIN SERPL-MCNC: 4.4 G/DL (ref 3.5–5.2)
ALBUMIN/GLOB SERPL: 1.4 G/DL
ALP SERPL-CCNC: 74 U/L (ref 39–117)
ALT SERPL W P-5'-P-CCNC: 26 U/L (ref 1–41)
ANION GAP SERPL CALCULATED.3IONS-SCNC: 10.9 MMOL/L (ref 5–15)
ARTICHOKE IGE QN: 114 MG/DL (ref 0–100)
AST SERPL-CCNC: 19 U/L (ref 1–40)
BILIRUB SERPL-MCNC: 0.2 MG/DL (ref 0–1.2)
BUN SERPL-MCNC: 21 MG/DL (ref 6–20)
BUN/CREAT SERPL: 19.6 (ref 7–25)
CALCIUM SPEC-SCNC: 9.6 MG/DL (ref 8.6–10.5)
CHLORIDE SERPL-SCNC: 101 MMOL/L (ref 98–107)
CO2 SERPL-SCNC: 24.1 MMOL/L (ref 22–29)
CREAT SERPL-MCNC: 1.07 MG/DL (ref 0.76–1.27)
DEPRECATED RDW RBC AUTO: 35.3 FL (ref 37–54)
EGFRCR SERPLBLD CKD-EPI 2021: 81.4 ML/MIN/1.73
ERYTHROCYTE [DISTWIDTH] IN BLOOD BY AUTOMATED COUNT: 11.8 % (ref 12.3–15.4)
GLOBULIN UR ELPH-MCNC: 3.1 GM/DL
GLUCOSE SERPL-MCNC: 271 MG/DL (ref 65–99)
HCT VFR BLD AUTO: 46.8 % (ref 37.5–51)
HGB BLD-MCNC: 14.9 G/DL (ref 13–17.7)
MCH RBC QN AUTO: 26.4 PG (ref 26.6–33)
MCHC RBC AUTO-ENTMCNC: 31.8 G/DL (ref 31.5–35.7)
MCV RBC AUTO: 82.8 FL (ref 79–97)
PLATELET # BLD AUTO: 229 10*3/MM3 (ref 140–450)
PMV BLD AUTO: 11.7 FL (ref 6–12)
POTASSIUM SERPL-SCNC: 4.4 MMOL/L (ref 3.5–5.2)
PROT SERPL-MCNC: 7.5 G/DL (ref 6–8.5)
RBC # BLD AUTO: 5.65 10*6/MM3 (ref 4.14–5.8)
SODIUM SERPL-SCNC: 136 MMOL/L (ref 136–145)
WBC NRBC COR # BLD AUTO: 5.96 10*3/MM3 (ref 3.4–10.8)

## 2024-01-02 PROCEDURE — 83036 HEMOGLOBIN GLYCOSYLATED A1C: CPT

## 2024-01-02 PROCEDURE — 99214 OFFICE O/P EST MOD 30 MIN: CPT | Performed by: INTERNAL MEDICINE

## 2024-01-02 PROCEDURE — 80053 COMPREHEN METABOLIC PANEL: CPT

## 2024-01-02 PROCEDURE — 83721 ASSAY OF BLOOD LIPOPROTEIN: CPT

## 2024-01-02 PROCEDURE — 1159F MED LIST DOCD IN RCRD: CPT | Performed by: INTERNAL MEDICINE

## 2024-01-02 PROCEDURE — 85027 COMPLETE CBC AUTOMATED: CPT

## 2024-01-02 PROCEDURE — 1160F RVW MEDS BY RX/DR IN RCRD: CPT | Performed by: INTERNAL MEDICINE

## 2024-01-02 RX ORDER — METOPROLOL SUCCINATE 50 MG/1
50 TABLET, EXTENDED RELEASE ORAL DAILY
Qty: 90 TABLET | Refills: 3 | Status: SHIPPED | OUTPATIENT
Start: 2024-01-02

## 2024-01-02 RX ORDER — NITROGLYCERIN 0.4 MG/1
TABLET SUBLINGUAL
Qty: 25 TABLET | Refills: 5 | Status: SHIPPED | OUTPATIENT
Start: 2024-01-02

## 2024-01-02 RX ORDER — GLIPIZIDE 5 MG/1
5 TABLET ORAL DAILY
Start: 2024-01-02

## 2024-01-02 RX ORDER — ROSUVASTATIN CALCIUM 40 MG/1
40 TABLET, COATED ORAL DAILY
Qty: 90 TABLET | Refills: 3 | Status: SHIPPED | OUTPATIENT
Start: 2024-01-02

## 2024-01-02 RX ORDER — LOSARTAN POTASSIUM 25 MG/1
25 TABLET ORAL DAILY
Qty: 90 TABLET | Refills: 3 | Status: SHIPPED | OUTPATIENT
Start: 2024-01-02

## 2024-01-02 NOTE — ASSESSMENT & PLAN NOTE
Obtain hemoglobin A1c   Continue empagliflozin, losartan, and statin therapy due to diabetic status

## 2024-01-02 NOTE — ASSESSMENT & PLAN NOTE
Does not tolerate rosuvastatin 40 mg daily  Obtain direct LDL and CMP today  If LDL >70, consider ezetimibe or Repatha

## 2024-01-03 ENCOUNTER — TELEPHONE (OUTPATIENT)
Dept: CARDIOLOGY | Facility: CLINIC | Age: 59
End: 2024-01-03
Payer: MEDICAID

## 2024-01-03 LAB — HBA1C MFR BLD: 9.7 % (ref 4.8–5.6)

## 2024-01-03 RX ORDER — EZETIMIBE 10 MG/1
10 TABLET ORAL DAILY
Qty: 90 TABLET | Refills: 3 | Status: SHIPPED | OUTPATIENT
Start: 2024-01-03

## 2024-01-03 NOTE — TELEPHONE ENCOUNTER
Spoke with the patient and reviewed results. He says his HA1C is improved. He wasn't sure of the exact number but 9 is improved. He follows up with his PCP every 6 months and he has been monitoring it.     He is not taking his Crestor 40 mg. He says it causes myalgias and he cuts it in half. He will add Zetia. He is also willing to try Repatha. He will call after two injections and we will check labs. Will send scripts.

## 2024-01-03 NOTE — PROGRESS NOTES
Please let him know his diabetes and cholesterol are both uncontrolled.  See if he is willing to take injections and if so start Repatha.  Otherwise start Zetia 10 mg daily.  He would be a good candidate for Ozempic.  He should discuss this with PCP

## 2024-01-03 NOTE — TELEPHONE ENCOUNTER
----- Message from Lio Abraham IV, MD sent at 1/3/2024  8:03 AM EST -----  Please let him know his diabetes and cholesterol are both uncontrolled.  See if he is willing to take injections and if so start Repatha.  Otherwise start Zetia 10 mg daily.  He would be a good candidate for Ozempic.  He should discuss this with PCP

## 2024-01-16 ENCOUNTER — TELEPHONE (OUTPATIENT)
Dept: CARDIOLOGY | Facility: CLINIC | Age: 59
End: 2024-01-16
Payer: MEDICAID

## 2024-01-16 NOTE — TELEPHONE ENCOUNTER
Patient called asking about his Repatha injections. I called the pharmacy and they reported that the PA was approved and they would have it ready for the patient. I called the patient back and relayed this information. He verbalized understanding.

## 2024-02-08 ENCOUNTER — TELEPHONE (OUTPATIENT)
Dept: CARDIOLOGY | Facility: CLINIC | Age: 59
End: 2024-02-08
Payer: MEDICAID

## 2024-02-08 NOTE — TELEPHONE ENCOUNTER
Patient called to ask for a refill of acid reflux medication. We do not have the patient on this. He will need to obtain from PCP. Attempted to call and let him know but there was no answer and no voicemail set up.

## 2024-02-09 ENCOUNTER — TELEPHONE (OUTPATIENT)
Dept: CARDIOLOGY | Facility: CLINIC | Age: 59
End: 2024-02-09
Payer: MEDICAID

## 2024-02-09 NOTE — TELEPHONE ENCOUNTER
Spoke with the patient. He says PCP put him on omeprazole but it is not working and he would like to try something else. Advised him to contact PCP office to let them know. He may want to try something else or need GI workup. Denies chest pain.

## 2024-12-16 RX ORDER — EVOLOCUMAB 140 MG/ML
INJECTION, SOLUTION SUBCUTANEOUS
Qty: 2 ML | Refills: 3 | Status: SHIPPED | OUTPATIENT
Start: 2024-12-16

## 2024-12-16 NOTE — TELEPHONE ENCOUNTER
Lab Results   Component Value Date    GLUCOSE 271 (H) 01/02/2024    BUN 21 (H) 01/02/2024    CREATININE 1.07 01/02/2024     01/02/2024    K 4.4 01/02/2024     01/02/2024    CALCIUM 9.6 01/02/2024    PROTEINTOT 7.5 01/02/2024    ALBUMIN 4.4 01/02/2024    ALT 26 01/02/2024    AST 19 01/02/2024    ALKPHOS 74 01/02/2024    BILITOT 0.2 01/02/2024    GLOB 3.1 01/02/2024    AGRATIO 1.4 01/02/2024    BCR 19.6 01/02/2024    ANIONGAP 10.9 01/02/2024    EGFR 81.4 01/02/2024      Lab Results   Component Value Date    CHOL 120 07/25/2018    CHLPL 138 08/20/2018    TRIG 97 08/20/2018    HDL 36 (L) 08/20/2018     (H) 01/02/2024

## 2024-12-27 DIAGNOSIS — E11.65 TYPE 2 DIABETES MELLITUS WITH HYPERGLYCEMIA, WITHOUT LONG-TERM CURRENT USE OF INSULIN: ICD-10-CM

## 2024-12-27 RX ORDER — EZETIMIBE 10 MG/1
10 TABLET ORAL DAILY
Qty: 90 TABLET | Refills: 0 | Status: SHIPPED | OUTPATIENT
Start: 2024-12-27

## 2024-12-27 RX ORDER — EMPAGLIFLOZIN 25 MG/1
25 TABLET, FILM COATED ORAL DAILY
Qty: 90 TABLET | Refills: 0 | Status: SHIPPED | OUTPATIENT
Start: 2024-12-27

## 2024-12-27 NOTE — TELEPHONE ENCOUNTER
Lab Results   Component Value Date     (H) 01/02/2024      Lab Results   Component Value Date    HGBA1C 9.70 (H) 01/02/2024     Patient needs labs for additional refills

## 2025-01-09 ENCOUNTER — TELEPHONE (OUTPATIENT)
Dept: CARDIOLOGY | Facility: CLINIC | Age: 60
End: 2025-01-09
Payer: MEDICAID

## 2025-02-28 DIAGNOSIS — E78.5 HYPERLIPIDEMIA LDL GOAL <70: ICD-10-CM

## 2025-02-28 RX ORDER — ROSUVASTATIN CALCIUM 40 MG/1
40 TABLET, COATED ORAL DAILY
Qty: 90 TABLET | Refills: 3 | Status: SHIPPED | OUTPATIENT
Start: 2025-02-28

## 2025-04-04 RX ORDER — EZETIMIBE 10 MG/1
10 TABLET ORAL DAILY
Qty: 90 TABLET | Refills: 0 | Status: SHIPPED | OUTPATIENT
Start: 2025-04-04

## 2025-04-07 RX ORDER — EVOLOCUMAB 140 MG/ML
INJECTION, SOLUTION SUBCUTANEOUS
Qty: 2 ML | Refills: 2 | Status: SHIPPED | OUTPATIENT
Start: 2025-04-07

## 2025-04-21 NOTE — PROGRESS NOTES
"    Cardiology Outpatient Visit      Identification: Maurice Harvey is a 59 y.o. male who resides in [place], [place].     Reason for visit:  CAD with previous MI      Subjective      Returns to the office today for follow-up.  He is doing well and remains very active without chest discomfort symptoms.  No recent blood work.  He states he was prescribed Ozempic for his diabetes but could not tolerate due to nausea, vomiting and diarrhea.    Review of Systems   Cardiovascular:  Negative for chest pain, claudication, cyanosis, dyspnea on exertion, irregular heartbeat, leg swelling, near-syncope, orthopnea, palpitations, paroxysmal nocturnal dyspnea and syncope.   Respiratory:  Negative for cough, hemoptysis, shortness of breath, sleep disturbances due to breathing, snoring, sputum production and wheezing.        Allergies   Allergen Reactions    Codeine Anaphylaxis    Oxycodone Itching    Atorvastatin Myalgia    Lisinopril Cough    Semaglutide Diarrhea and Nausea And Vomiting         Current Outpatient Medications   Medication Instructions    aspirin 81 mg, Oral, Daily    clopidogrel (PLAVIX) 75 mg, Oral, Daily    ezetimibe (ZETIA) 10 mg, Oral, Daily    glipizide (GLUCOTROL) 5 mg, Oral, Daily    Jardiance 25 mg, Oral, Daily    losartan (COZAAR) 25 mg, Oral, Daily    metoprolol succinate XL (TOPROL-XL) 50 mg, Oral, Daily    nitroglycerin (NITROSTAT) 0.4 MG SL tablet Place 1 under the tongue as needed for chest pain, may repeat every 5 mins for up three doses    Repatha SureClick 140 mg, Subcutaneous, Every 14 Days    rosuvastatin (CRESTOR) 20 mg, Oral, Nightly    Tradjenta 5 MG tablet tablet take 1 tablet(s) daily    Trulicity 1.5 mg, Subcutaneous, Weekly         Objective     /74 (BP Location: Right arm, Patient Position: Sitting, Cuff Size: Adult)   Pulse 86   Ht 190.5 cm (75\")   Wt 119 kg (262 lb)   SpO2 95%   BMI 32.75 kg/m²       Constitutional:       Appearance: Healthy appearance.   Eyes:      " General: No scleral icterus.  Neck:      Thyroid: No thyroid mass.      Vascular: No carotid bruit or JVD. JVD normal.   Pulmonary:      Effort: Pulmonary effort is normal.      Breath sounds: Normal breath sounds.   Cardiovascular:      Normal rate. Regular rhythm.      Murmurs: There is no murmur.      No gallop.    Edema:     Peripheral edema absent.   Skin:     General: Skin is warm. There is no cyanosis.   Neurological:      General: No focal deficit present.      Mental Status: Alert.   Psychiatric:         Attention and Perception: Attention normal.         Result Review  (reviewed with patient):        ECG 12 Lead    Date/Time: 4/22/2025 2:05 PM  Performed by: Lio Abraham IV, MD    Authorized by: Lio Abraham IV, MD  Comparison: compared with previous ECG from 6/20/2022  Similar to previous ECG  Rhythm: sinus rhythm  BPM: 86    Clinical impression: abnormal EKG  Comments: Inferior infarct           Labs (6/21/2024):    WBC 6.3, RBC 5.72, HGB 15.7, HCT 50.6,   Glucose 166, creat 1.07, BUN 20, Na 140, K 4.4, AST 34, ALT 35  HA1C 10.1  Chol 91, Trig 72, HDL 45, LDL 31  TSH 1.75      Assessment     Diagnoses and all orders for this visit:    1. Coronary artery disease involving native coronary artery of native heart with angina pectoris (Primary)  Overview:  Cardiac catheterization for inferior STEMI @ [facility] (07/05/2018):  100% occlusions of the RCA and left circumflex status post PEPE. Severe distal LAD and first diagonal disease noted.  Cardiac catheterization for recurrent chest pain 4 days post STEMI (7/9/18): Severe 1-vessel CAD involving the mid/distal LAD and large first diagonal branch. Successful PCI of the mid LAD and distal RCA using PEPE. Successful PCI of D1 using a Xience 2.5 x 23 mm PEPE.  Echo (7/10/18): LVEF 42% with inferolateral hypokinesis. No significant valvular abnormality.    Assessment & Plan:  Asymptomatic  Continue DAPT, beta  blocker, and statin therapy    Orders:  -     clopidogrel (PLAVIX) 75 MG tablet; Take 1 tablet by mouth Daily.  Dispense: 90 tablet; Refill: 3  -     aspirin 81 MG EC tablet; Take 1 tablet by mouth Daily.  Dispense: 90 tablet; Refill: 3  -     nitroglycerin (NITROSTAT) 0.4 MG SL tablet; Place 1 under the tongue as needed for chest pain, may repeat every 5 mins for up three doses  Dispense: 25 tablet; Refill: 5  -     metoprolol succinate XL (TOPROL-XL) 50 MG 24 hr tablet; Take 1 tablet by mouth Daily.  Dispense: 90 tablet; Refill: 3  -     CBC (No Diff); Future    2. Hyperlipidemia LDL goal <50  Overview:  High intensity statin therapy indicated given presence of CAD  Intolerant to atorvastatin and max dose rosuvastatin (myalgias)    Assessment & Plan:  Obtain direct LDL and LP(a) today  Continue rosuvastatin 20, evolocumab  If LDL very low, may discontinue ezetimibe    Orders:  -     Evolocumab (Repatha SureClick) solution auto-injector SureClick injection; Inject 1 mL under the skin into the appropriate area as directed Every 14 (Fourteen) Days.  Dispense: 6 mL; Refill: 3  -     ezetimibe (ZETIA) 10 MG tablet; Take 1 tablet by mouth Daily.  Dispense: 90 tablet; Refill: 3  -     rosuvastatin (CRESTOR) 20 MG tablet; Take 1 tablet by mouth Every Night.  Dispense: 90 tablet; Refill: 3  -     Comprehensive Metabolic Panel; Future  -     Lipoprotein A (LPA); Future  -     LDL Cholesterol, Direct; Future    3. Type 2 diabetes mellitus with hyperglycemia, without long-term current use of insulin  Overview:  Intolerant to semaglutide    Assessment & Plan:  Obtain hemoglobin A1c today  Continue empagliflozin and dulaglutide    Orders:  -     losartan (COZAAR) 25 MG tablet; Take 1 tablet by mouth Daily.  Dispense: 90 tablet; Refill: 3  -     Dulaglutide (Trulicity) 1.5 MG/0.5ML solution auto-injector; Inject 1.5 mg under the skin into the appropriate area as directed 1 (One) Time Per Week.  -     Hemoglobin A1c;  Future    Other orders  -     ECG 12 Lead          Plan   Obtain lab work today including CMP, CBC, LP(a), direct LDL, hemoglobin A1c  If LDL very low, may discontinue ezetimibe      Follow-up   Return in about 1 year (around 4/22/2026).        Geovanni Abraham MD, [facility], [facility]  4/22/2025

## 2025-04-22 ENCOUNTER — OFFICE VISIT (OUTPATIENT)
Dept: CARDIOLOGY | Facility: CLINIC | Age: 60
End: 2025-04-22
Payer: MEDICAID

## 2025-04-22 ENCOUNTER — LAB (OUTPATIENT)
Dept: LAB | Facility: HOSPITAL | Age: 60
End: 2025-04-22
Payer: MEDICAID

## 2025-04-22 VITALS
HEART RATE: 86 BPM | WEIGHT: 262 LBS | HEIGHT: 75 IN | BODY MASS INDEX: 32.58 KG/M2 | DIASTOLIC BLOOD PRESSURE: 74 MMHG | SYSTOLIC BLOOD PRESSURE: 136 MMHG | OXYGEN SATURATION: 95 %

## 2025-04-22 DIAGNOSIS — I25.119 CORONARY ARTERY DISEASE INVOLVING NATIVE CORONARY ARTERY OF NATIVE HEART WITH ANGINA PECTORIS: Primary | ICD-10-CM

## 2025-04-22 DIAGNOSIS — E11.65 TYPE 2 DIABETES MELLITUS WITH HYPERGLYCEMIA, WITHOUT LONG-TERM CURRENT USE OF INSULIN: ICD-10-CM

## 2025-04-22 DIAGNOSIS — I25.119 CORONARY ARTERY DISEASE INVOLVING NATIVE CORONARY ARTERY OF NATIVE HEART WITH ANGINA PECTORIS: ICD-10-CM

## 2025-04-22 DIAGNOSIS — E78.5 HYPERLIPIDEMIA LDL GOAL <50: ICD-10-CM

## 2025-04-22 LAB
ALBUMIN SERPL-MCNC: 4.4 G/DL (ref 3.5–5.2)
ALBUMIN/GLOB SERPL: 1.3 G/DL
ALP SERPL-CCNC: 87 U/L (ref 39–117)
ALT SERPL W P-5'-P-CCNC: 36 U/L (ref 1–41)
ANION GAP SERPL CALCULATED.3IONS-SCNC: 8.6 MMOL/L (ref 5–15)
ARTICHOKE IGE QN: 13 MG/DL (ref 0–100)
AST SERPL-CCNC: 28 U/L (ref 1–40)
BILIRUB SERPL-MCNC: 0.3 MG/DL (ref 0–1.2)
BUN SERPL-MCNC: 17 MG/DL (ref 6–20)
BUN/CREAT SERPL: 15.7 (ref 7–25)
CALCIUM SPEC-SCNC: 9.9 MG/DL (ref 8.6–10.5)
CHLORIDE SERPL-SCNC: 100 MMOL/L (ref 98–107)
CO2 SERPL-SCNC: 29.4 MMOL/L (ref 22–29)
CREAT SERPL-MCNC: 1.08 MG/DL (ref 0.76–1.27)
DEPRECATED RDW RBC AUTO: 36.3 FL (ref 37–54)
EGFRCR SERPLBLD CKD-EPI 2021: 80 ML/MIN/1.73
ERYTHROCYTE [DISTWIDTH] IN BLOOD BY AUTOMATED COUNT: 12 % (ref 12.3–15.4)
GLOBULIN UR ELPH-MCNC: 3.3 GM/DL
GLUCOSE SERPL-MCNC: 182 MG/DL (ref 65–99)
HCT VFR BLD AUTO: 49.4 % (ref 37.5–51)
HGB BLD-MCNC: 16 G/DL (ref 13–17.7)
MCH RBC QN AUTO: 27 PG (ref 26.6–33)
MCHC RBC AUTO-ENTMCNC: 32.4 G/DL (ref 31.5–35.7)
MCV RBC AUTO: 83.4 FL (ref 79–97)
PLATELET # BLD AUTO: 230 10*3/MM3 (ref 140–450)
PMV BLD AUTO: 11.1 FL (ref 6–12)
POTASSIUM SERPL-SCNC: 4.4 MMOL/L (ref 3.5–5.2)
PROT SERPL-MCNC: 7.7 G/DL (ref 6–8.5)
RBC # BLD AUTO: 5.92 10*6/MM3 (ref 4.14–5.8)
SODIUM SERPL-SCNC: 138 MMOL/L (ref 136–145)
WBC NRBC COR # BLD AUTO: 7.06 10*3/MM3 (ref 3.4–10.8)

## 2025-04-22 PROCEDURE — 83721 ASSAY OF BLOOD LIPOPROTEIN: CPT

## 2025-04-22 PROCEDURE — 36415 COLL VENOUS BLD VENIPUNCTURE: CPT

## 2025-04-22 PROCEDURE — 83036 HEMOGLOBIN GLYCOSYLATED A1C: CPT

## 2025-04-22 PROCEDURE — 1159F MED LIST DOCD IN RCRD: CPT | Performed by: INTERNAL MEDICINE

## 2025-04-22 PROCEDURE — 83695 ASSAY OF LIPOPROTEIN(A): CPT

## 2025-04-22 PROCEDURE — 1160F RVW MEDS BY RX/DR IN RCRD: CPT | Performed by: INTERNAL MEDICINE

## 2025-04-22 PROCEDURE — 85027 COMPLETE CBC AUTOMATED: CPT

## 2025-04-22 PROCEDURE — 99214 OFFICE O/P EST MOD 30 MIN: CPT | Performed by: INTERNAL MEDICINE

## 2025-04-22 PROCEDURE — 93000 ELECTROCARDIOGRAM COMPLETE: CPT | Performed by: INTERNAL MEDICINE

## 2025-04-22 PROCEDURE — 80053 COMPREHEN METABOLIC PANEL: CPT

## 2025-04-22 RX ORDER — LOSARTAN POTASSIUM 25 MG/1
25 TABLET ORAL DAILY
Qty: 90 TABLET | Refills: 3 | Status: SHIPPED | OUTPATIENT
Start: 2025-04-22

## 2025-04-22 RX ORDER — NITROGLYCERIN 0.4 MG/1
TABLET SUBLINGUAL
Qty: 25 TABLET | Refills: 5 | Status: SHIPPED | OUTPATIENT
Start: 2025-04-22

## 2025-04-22 RX ORDER — METOPROLOL SUCCINATE 50 MG/1
50 TABLET, EXTENDED RELEASE ORAL DAILY
Qty: 90 TABLET | Refills: 3 | Status: SHIPPED | OUTPATIENT
Start: 2025-04-22

## 2025-04-22 RX ORDER — LINAGLIPTIN 5 MG/1
TABLET, FILM COATED ORAL
COMMUNITY
Start: 2025-04-16

## 2025-04-22 RX ORDER — EZETIMIBE 10 MG/1
10 TABLET ORAL DAILY
Qty: 90 TABLET | Refills: 3 | Status: SHIPPED | OUTPATIENT
Start: 2025-04-22 | End: 2025-04-23

## 2025-04-22 RX ORDER — DULAGLUTIDE 1.5 MG/.5ML
1.5 INJECTION, SOLUTION SUBCUTANEOUS WEEKLY
Start: 2025-04-22

## 2025-04-22 RX ORDER — DULAGLUTIDE 1.5 MG/.5ML
INJECTION, SOLUTION SUBCUTANEOUS
COMMUNITY
Start: 2025-04-14 | End: 2025-04-22 | Stop reason: SDUPTHER

## 2025-04-22 RX ORDER — ASPIRIN 81 MG/1
81 TABLET ORAL DAILY
Qty: 90 TABLET | Refills: 3 | Status: SHIPPED | OUTPATIENT
Start: 2025-04-22

## 2025-04-22 RX ORDER — EVOLOCUMAB 140 MG/ML
140 INJECTION, SOLUTION SUBCUTANEOUS
Qty: 6 ML | Refills: 3 | Status: SHIPPED | OUTPATIENT
Start: 2025-04-22

## 2025-04-22 RX ORDER — CLOPIDOGREL BISULFATE 75 MG/1
75 TABLET ORAL DAILY
Qty: 90 TABLET | Refills: 3 | Status: SHIPPED | OUTPATIENT
Start: 2025-04-22

## 2025-04-22 RX ORDER — ROSUVASTATIN CALCIUM 20 MG/1
20 TABLET, COATED ORAL NIGHTLY
Qty: 90 TABLET | Refills: 3 | Status: SHIPPED | OUTPATIENT
Start: 2025-04-22

## 2025-04-22 NOTE — LETTER
April 22, 2025     Chava Eisenberg MD  [address] [facility] [address]    Patient: Maurice Harvey   YOB: 1965   Date of Visit: 4/22/2025     Dear Chava Eisenberg MD:       Thank you for referring Maurice Harvey to me for evaluation. Below are the relevant portions of my assessment and plan of care.    If you have questions, please do not hesitate to call me. I look forward to following Maurice along with you.         Sincerely,        Lio Abraham IV, MD        CC: No Recipients    Lio Abraham IV, MD  04/22/25 1408  Signed      Cardiology Outpatient Visit      Identification: Maurice Harvey is a 59 y.o. male who resides in [place], [place].     Reason for visit:  CAD with previous MI      Subjective     Returns to the office today for follow-up.  He is doing well and remains very active without chest discomfort symptoms.  No recent blood work.  He states he was prescribed Ozempic for his diabetes but could not tolerate due to nausea, vomiting and diarrhea.    Review of Systems   Cardiovascular:  Negative for chest pain, claudication, cyanosis, dyspnea on exertion, irregular heartbeat, leg swelling, near-syncope, orthopnea, palpitations, paroxysmal nocturnal dyspnea and syncope.   Respiratory:  Negative for cough, hemoptysis, shortness of breath, sleep disturbances due to breathing, snoring, sputum production and wheezing.        Allergies   Allergen Reactions   • Codeine Anaphylaxis   • Oxycodone Itching   • Atorvastatin Myalgia   • Lisinopril Cough   • Semaglutide Diarrhea and Nausea And Vomiting         Current Outpatient Medications   Medication Instructions   • aspirin 81 mg, Oral, Daily   • clopidogrel (PLAVIX) 75 mg, Oral, Daily   • ezetimibe (ZETIA) 10 mg, Oral, Daily   • glipizide (GLUCOTROL) 5 mg, Oral, Daily   • Jardiance 25 mg, Oral, Daily   • losartan (COZAAR) 25 mg, Oral, Daily   • metoprolol succinate XL (TOPROL-XL) 50 mg, Oral, Daily   •  "nitroglycerin (NITROSTAT) 0.4 MG SL tablet Place 1 under the tongue as needed for chest pain, may repeat every 5 mins for up three doses   • Repatha SureClick 140 mg, Subcutaneous, Every 14 Days   • rosuvastatin (CRESTOR) 20 mg, Oral, Nightly   • Tradjenta 5 MG tablet tablet take 1 tablet(s) daily   • Trulicity 1.5 mg, Subcutaneous, Weekly         Objective    /74 (BP Location: Right arm, Patient Position: Sitting, Cuff Size: Adult)   Pulse 86   Ht 190.5 cm (75\")   Wt 119 kg (262 lb)   SpO2 95%   BMI 32.75 kg/m²       Constitutional:       Appearance: Healthy appearance.   Eyes:      General: No scleral icterus.  Neck:      Thyroid: No thyroid mass.      Vascular: No carotid bruit or JVD. JVD normal.   Pulmonary:      Effort: Pulmonary effort is normal.      Breath sounds: Normal breath sounds.   Cardiovascular:      Normal rate. Regular rhythm.      Murmurs: There is no murmur.      No gallop.    Edema:     Peripheral edema absent.   Skin:     General: Skin is warm. There is no cyanosis.   Neurological:      General: No focal deficit present.      Mental Status: Alert.   Psychiatric:         Attention and Perception: Attention normal.         Result Review (reviewed with patient):        ECG 12 Lead    Date/Time: 4/22/2025 2:05 PM  Performed by: Lio Abraham IV, MD    Authorized by: Lio Abraham IV, MD  Comparison: compared with previous ECG from 6/20/2022  Similar to previous ECG  Rhythm: sinus rhythm  BPM: 86    Clinical impression: abnormal EKG  Comments: Inferior infarct           Labs (6/21/2024):    WBC 6.3, RBC 5.72, HGB 15.7, HCT 50.6,   Glucose 166, creat 1.07, BUN 20, Na 140, K 4.4, AST 34, ALT 35  HA1C 10.1  Chol 91, Trig 72, HDL 45, LDL 31  TSH 1.75      Assessment    Diagnoses and all orders for this visit:    1. Coronary artery disease involving native coronary artery of native heart with angina pectoris (Primary)  Overview:  Cardiac catheterization for " inferior STEMI @ [facility] (07/05/2018):  100% occlusions of the RCA and left circumflex status post PEPE. Severe distal LAD and first diagonal disease noted.  Cardiac catheterization for recurrent chest pain 4 days post STEMI (7/9/18): Severe 1-vessel CAD involving the mid/distal LAD and large first diagonal branch. Successful PCI of the mid LAD and distal RCA using PEPE. Successful PCI of D1 using a Xience 2.5 x 23 mm PEPE.  Echo (7/10/18): LVEF 42% with inferolateral hypokinesis. No significant valvular abnormality.    Assessment & Plan:  Asymptomatic  Continue DAPT, beta blocker, and statin therapy    Orders:  -     clopidogrel (PLAVIX) 75 MG tablet; Take 1 tablet by mouth Daily.  Dispense: 90 tablet; Refill: 3  -     aspirin 81 MG EC tablet; Take 1 tablet by mouth Daily.  Dispense: 90 tablet; Refill: 3  -     nitroglycerin (NITROSTAT) 0.4 MG SL tablet; Place 1 under the tongue as needed for chest pain, may repeat every 5 mins for up three doses  Dispense: 25 tablet; Refill: 5  -     metoprolol succinate XL (TOPROL-XL) 50 MG 24 hr tablet; Take 1 tablet by mouth Daily.  Dispense: 90 tablet; Refill: 3  -     CBC (No Diff); Future    2. Hyperlipidemia LDL goal <50  Overview:  High intensity statin therapy indicated given presence of CAD  Intolerant to atorvastatin and max dose rosuvastatin (myalgias)    Assessment & Plan:  Obtain direct LDL and LP(a) today  Continue rosuvastatin 20, evolocumab  If LDL very low, may discontinue ezetimibe    Orders:  -     Evolocumab (Repatha SureClick) solution auto-injector SureClick injection; Inject 1 mL under the skin into the appropriate area as directed Every 14 (Fourteen) Days.  Dispense: 6 mL; Refill: 3  -     ezetimibe (ZETIA) 10 MG tablet; Take 1 tablet by mouth Daily.  Dispense: 90 tablet; Refill: 3  -     rosuvastatin (CRESTOR) 20 MG tablet; Take 1 tablet by mouth Every Night.  Dispense: 90 tablet; Refill: 3  -     Comprehensive Metabolic Panel;  Future  -     Lipoprotein A (LPA); Future  -     LDL Cholesterol, Direct; Future    3. Type 2 diabetes mellitus with hyperglycemia, without long-term current use of insulin  Overview:  Intolerant to semaglutide    Assessment & Plan:  Obtain hemoglobin A1c today  Continue empagliflozin and dulaglutide    Orders:  -     losartan (COZAAR) 25 MG tablet; Take 1 tablet by mouth Daily.  Dispense: 90 tablet; Refill: 3  -     Dulaglutide (Trulicity) 1.5 MG/0.5ML solution auto-injector; Inject 1.5 mg under the skin into the appropriate area as directed 1 (One) Time Per Week.  -     Hemoglobin A1c; Future    Other orders  -     ECG 12 Lead          Plan  Obtain lab work today including CMP, CBC, LP(a), direct LDL, hemoglobin A1c  If LDL very low, may discontinue ezetimibe      Follow-up   Return in about 1 year (around 4/22/2026).        Geovanni Abraham MD, FACC, [facility]  4/22/2025

## 2025-04-22 NOTE — ASSESSMENT & PLAN NOTE
Obtain direct LDL and LP(a) today  Continue rosuvastatin 20, evolocumab  If LDL very low, may discontinue ezetimibe

## 2025-04-22 NOTE — LETTER
April 22, 2025     No Recipients    Patient: Maurice Harvey   YOB: 1965   Date of Visit: 4/22/2025     Dear No Recipients:       Thank you for referring Maurice Harvey to me for evaluation. Below are the relevant portions of my assessment and plan of care.    If you have questions, please do not hesitate to call me. I look forward to following Maurice along with you.         Sincerely,        Lio Abraham IV, MD        CC: No Recipients    Lio Abraham IV, MD  04/22/25 1358  Sign when Signing Visit      Cardiology Outpatient Visit      Identification: Maurice Harvey is a 59 y.o. male who resides in [place], [place].     Reason for visit:  CAD with previous MI      Subjective     ***     Review of Systems   Cardiovascular:  Negative for chest pain, claudication, cyanosis, dyspnea on exertion, irregular heartbeat, leg swelling, near-syncope, orthopnea, palpitations, paroxysmal nocturnal dyspnea and syncope.   Respiratory:  Negative for cough, hemoptysis, shortness of breath, sleep disturbances due to breathing, snoring, sputum production and wheezing.        Allergies   Allergen Reactions    Codeine Anaphylaxis    Oxycodone Itching    Atorvastatin Myalgia    Lisinopril Cough         Current Outpatient Medications   Medication Instructions    aspirin 81 mg, Oral, Daily    clopidogrel (PLAVIX) 75 mg, Oral, Daily    Evolocumab (Repatha SureClick) solution auto-injector SureClick injection INJECT 1 ML UNDER THE SKIN INTO THE APPROPRIATE AREA AS DIRECTED EVERY 14 (FOURTEEN) DAYS.    ezetimibe (ZETIA) 10 mg, Oral, Daily    glipizide (GLUCOTROL) 5 mg, Oral, Daily    Jardiance 25 mg, Oral, Daily    losartan (COZAAR) 25 mg, Oral, Daily    metoprolol succinate XL (TOPROL-XL) 50 mg, Oral, Daily    nitroglycerin (NITROSTAT) 0.4 MG SL tablet Place 1 under the tongue as needed for chest pain, may repeat every 5 mins for up three doses    rosuvastatin (CRESTOR) 40 mg, Oral,  "Daily    Tradjenta 5 MG tablet tablet take 1 tablet(s) daily    Trulicity 1.5 MG/0.5ML solution auto-injector INJECT 1,5 MG UNDER THE SKIN ONCE A WEEK         Objective    /74 (BP Location: Right arm, Patient Position: Sitting, Cuff Size: Adult)   Pulse 86   Ht 190.5 cm (75\")   Wt 119 kg (262 lb)   SpO2 95%   BMI 32.75 kg/m²       Constitutional:       Appearance: Healthy appearance.   Eyes:      General: No scleral icterus.  Neck:      Thyroid: No thyroid mass.      Vascular: No carotid bruit or JVD. JVD normal.   Pulmonary:      Effort: Pulmonary effort is normal.      Breath sounds: Normal breath sounds.   Cardiovascular:      Normal rate. Regular rhythm.      Murmurs: There is no murmur.      No gallop.    Edema:     Peripheral edema absent.   Skin:     General: Skin is warm. There is no cyanosis.   Neurological:      General: No focal deficit present.      Mental Status: Alert.   Psychiatric:         Attention and Perception: Attention normal.         Result Review (reviewed with patient):            Labs (6/21/2024):    WBC 6.3, RBC 5.72, HGB 15.7, HCT 50.6,   Glucose 166, creat 1.07, BUN 20, Na 140, K 4.4, AST 34, ALT 35  HA1C 10.1  Chol 91, Trig 72, HDL 45, LDL 31  TSH 1.75      Assessment    Diagnoses and all orders for this visit:    1. Coronary artery disease involving native coronary artery of native heart with angina pectoris (Primary)  Overview:  Cardiac catheterization for inferior STEMI @ [facility] (07/05/2018):  100% occlusions of the RCA and left circumflex status post PEPE. Severe distal LAD and first diagonal disease noted.  Cardiac catheterization for recurrent chest pain 4 days post STEMI (7/9/18): Severe 1-vessel CAD involving the mid/distal LAD and large first diagonal branch. Successful PCI of the mid LAD and distal RCA using PEPE. Successful PCI of D1 using a Xience 2.5 x 23 mm PEPE.  Echo (7/10/18): LVEF 42% with inferolateral hypokinesis. No " significant valvular abnormality.      2. Hyperlipidemia LDL goal <50  Overview:  High intensity statin therapy indicated given presence of CAD  Intolerant to atorvastatin and max dose rosuvastatin (myalgias)      3. Type 2 diabetes mellitus with hyperglycemia, without long-term current use of insulin    4. Hyperlipidemia LDL goal <70          Plan  ***      Follow-up   No follow-ups on file.        Geovanni Abraham MD, Located within [facility], [facility]  4/22/2025

## 2025-04-23 ENCOUNTER — RESULTS FOLLOW-UP (OUTPATIENT)
Dept: CARDIOLOGY | Facility: CLINIC | Age: 60
End: 2025-04-23
Payer: MEDICAID

## 2025-04-23 LAB — HBA1C MFR BLD: 10.5 % (ref 4.8–5.6)

## 2025-04-23 NOTE — LETTER
Maurice NERY Wes  [address] [place] [address]    April 23, 2025     Dear Mr. Harvey:    Below are the results from your recent visit:    Resulted Orders   Comprehensive Metabolic Panel   Result Value Ref Range    Glucose 182 (H) 65 - 99 mg/dL    BUN 17 6 - 20 mg/dL    Creatinine 1.08 0.76 - 1.27 mg/dL    Sodium 138 136 - 145 mmol/L    Potassium 4.4 3.5 - 5.2 mmol/L    Chloride 100 98 - 107 mmol/L    CO2 29.4 (H) 22.0 - 29.0 mmol/L    Calcium 9.9 8.6 - 10.5 mg/dL    Total Protein 7.7 6.0 - 8.5 g/dL    Albumin 4.4 3.5 - 5.2 g/dL    ALT (SGPT) 36 1 - 41 U/L    AST (SGOT) 28 1 - 40 U/L    Alkaline Phosphatase 87 39 - 117 U/L    Total Bilirubin 0.3 0.0 - 1.2 mg/dL    Globulin 3.3 gm/dL    A/G Ratio 1.3 g/dL    BUN/Creatinine Ratio 15.7 7.0 - 25.0    Anion Gap 8.6 5.0 - 15.0 mmol/L    eGFR 80.0 >60.0 mL/min/1.73   CBC (No Diff)   Result Value Ref Range    WBC 7.06 3.40 - 10.80 10*3/mm3    RBC 5.92 (H) 4.14 - 5.80 10*6/mm3    Hemoglobin 16.0 13.0 - 17.7 g/dL    Hematocrit 49.4 37.5 - 51.0 %    MCV 83.4 79.0 - 97.0 fL    MCH 27.0 26.6 - 33.0 pg    MCHC 32.4 31.5 - 35.7 g/dL    RDW 12.0 (L) 12.3 - 15.4 %    RDW-SD 36.3 (L) 37.0 - 54.0 fl    MPV 11.1 6.0 - 12.0 fL    Platelets 230 140 - 450 10*3/mm3   LDL Cholesterol, Direct   Result Value Ref Range    LDL Cholesterol  13 0 - 100 mg/dL   Hemoglobin A1c   Result Value Ref Range    Hemoglobin A1C 10.50 (H) 4.80 - 5.60 %       You can stop taking ezetimibe. Your diabetes is disappointingly high.  Very important that you see endocrinologist as we discussed.          If you have any questions or concerns, please don't hesitate to call.         Sincerely,        Lio Abraham IV, MD

## 2025-04-23 NOTE — PROGRESS NOTES
He can stop taking ezetimibe.  Please tell him his diabetes is disappointingly high.  Very important that he see endocrinologist as we discussed

## 2025-04-24 LAB — LPA SERPL-SCNC: 35.6 NMOL/L

## (undated) DEVICE — GUIDE CATHETER: Brand: MACH1™

## (undated) DEVICE — Device: Brand: ASAHI SION BLUE

## (undated) DEVICE — INTRO SHEATH PRELUDE IDEAL SPRNG COIL 021 6F 23X80CM

## (undated) DEVICE — NC TREK CORONARY DILATATION CATHETER 3.5 MM X 8 MM / RAPID-EXCHANGE: Brand: NC TREK

## (undated) DEVICE — Device

## (undated) DEVICE — DEV COMP RAD PRELUDESYNC 24CM

## (undated) DEVICE — MINI TREK CORONARY DILATATION CATHETER 2.0 MM X 15 MM / RAPID-EXCHANGE: Brand: MINI TREK

## (undated) DEVICE — CATH DIAG EXPO M/ PK 6FR FL4/FR4 PIG 3PK

## (undated) DEVICE — NC TREK CORONARY DILATATION CATHETER 2.5 MM X 20 MM / RAPID-EXCHANGE: Brand: NC TREK

## (undated) DEVICE — PK CATH CARD 10

## (undated) DEVICE — DEV INFL MONARCH 25W

## (undated) DEVICE — MODEL AT P65, P/N 701554-001KIT CONTENTS: HAND CONTROLLER, 3-WAY HIGH-PRESSURE STOPCOCK WITH ROTATING END AND PREMIUM HIGH-PRESSURE TUBING: Brand: ANGIOTOUCH® KIT

## (undated) DEVICE — MODEL BT2000 P/N 700287-012KIT CONTENTS: MANIFOLD WITH SALINE AND CONTRAST PORTS, SALINE TUBING WITH SPIKE AND HAND SYRINGE, TRANSDUCER: Brand: BT2000 AUTOMATED MANIFOLD KIT

## (undated) DEVICE — CATH DIAG EXPO .056 FL3.5 6F 100CM

## (undated) DEVICE — NC TREK CORONARY DILATATION CATHETER 2.75 MM X 12 MM / RAPID-EXCHANGE: Brand: NC TREK